# Patient Record
Sex: FEMALE | Race: BLACK OR AFRICAN AMERICAN | NOT HISPANIC OR LATINO | Employment: FULL TIME | URBAN - METROPOLITAN AREA
[De-identification: names, ages, dates, MRNs, and addresses within clinical notes are randomized per-mention and may not be internally consistent; named-entity substitution may affect disease eponyms.]

---

## 2017-11-13 ENCOUNTER — HOSPITAL ENCOUNTER (EMERGENCY)
Facility: HOSPITAL | Age: 42
Discharge: HOME | End: 2017-11-14
Attending: EMERGENCY MEDICINE
Payer: MEDICAID

## 2017-11-13 VITALS
HEART RATE: 87 BPM | WEIGHT: 146 LBS | SYSTOLIC BLOOD PRESSURE: 120 MMHG | RESPIRATION RATE: 16 BRPM | HEIGHT: 61 IN | OXYGEN SATURATION: 96 % | DIASTOLIC BLOOD PRESSURE: 98 MMHG | BODY MASS INDEX: 27.56 KG/M2 | TEMPERATURE: 98 F
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
## 2017-11-13 DIAGNOSIS — M54.6 ACUTE BILATERAL THORACIC BACK PAIN: Primary | ICD-10-CM

## 2017-11-13 LAB
B-HCG UR QL: NEGATIVE
CTP QC/QA: YES

## 2017-11-13 PROCEDURE — 81025 URINE PREGNANCY TEST: CPT | Performed by: NURSE PRACTITIONER

## 2017-11-13 PROCEDURE — 25000003 PHARM REV CODE 250: Performed by: NURSE PRACTITIONER

## 2017-11-13 RX ORDER — IBUPROFEN 600 MG/1
600 TABLET ORAL
Status: COMPLETED | OUTPATIENT
Start: 2017-11-13 | End: 2017-11-13

## 2017-11-13 RX ADMIN — IBUPROFEN 600 MG: 600 TABLET, FILM COATED ORAL at 11:11

## 2017-11-14 RX ORDER — METHOCARBAMOL 500 MG/1
1000 TABLET, FILM COATED ORAL 3 TIMES DAILY
Qty: 30 TABLET | Refills: 0 | Status: SHIPPED | OUTPATIENT
Start: 2017-11-14 | End: 2017-11-19

## 2017-11-14 RX ORDER — IBUPROFEN 600 MG/1
600 TABLET ORAL EVERY 6 HOURS PRN
Qty: 20 TABLET | Refills: 0 | Status: SHIPPED | OUTPATIENT
Start: 2017-11-14 | End: 2018-04-19 | Stop reason: SDUPTHER

## 2017-11-14 NOTE — ED PROVIDER NOTES
"Encounter Date: 11/13/2017    SCRIBE #1 NOTE: I, Rhea Hall, am scribing for, and in the presence of,  Hilary Duvall NP . I have scribed the following portions of the note - Other sections scribed: HPI and ROS .       History     Chief Complaint   Patient presents with    Neck Pain     " I sat in a chair last week and it broke. I fell backwards hitting a wall. My neck and upper back started hurting."      CC: Neck and Back Pain.   History obtained from patient.  Pt arrived via personal transportation.     HPI: This 42 y.o. Female, who has Anemia, HTN, encounter for blood transfusion, and a history of seizure disorder, presents to the ED for evaluation of posterior neck pain, upper back pain, and occipital headache x 1 week secondary to falling backwards in a broken folding chair and hitting back, neck, and occipital head on the wall behind her. Symptoms have progressively worsened. She describes pain as constant, moderately severe (7/10), and "tight." She denies any LOC at that time. She denies blurred vision, nausea, vomiting, numbness, tingling, fever, chills, chest pain, or abdominal pain. Over the past week, she has used Motrin and Advil without relief of pain. She took Motrin this morning with no relief. Pain is exacerbated by palpation and movement. She reports no further symptoms. No alleviating factors.           Review of patient's allergies indicates:  No Known Allergies  Past Medical History:   Diagnosis Date    Anemia     Encounter for blood transfusion     History of seizure disorder     HTN (hypertension)     Seizures     last one when she was 20 years old     Past Surgical History:   Procedure Laterality Date    HERNIA REPAIR      TUBAL LIGATION       Family History   Problem Relation Age of Onset    Asthma Mother     Asthma Sister      Social History   Substance Use Topics    Smoking status: Never Smoker    Smokeless tobacco: Never Used    Alcohol use Yes      Comment: occasional "     Review of Systems   Constitutional: Negative for chills and fever.   HENT: Negative for congestion.    Eyes: Negative for redness.   Respiratory: Negative for cough and shortness of breath.    Cardiovascular: Negative for chest pain.   Gastrointestinal: Negative for abdominal pain, diarrhea, nausea and vomiting.   Genitourinary: Negative for difficulty urinating and dysuria.   Musculoskeletal: Positive for back pain and neck pain.   Skin: Negative for rash and wound.   Neurological: Positive for headaches. Negative for syncope.       Physical Exam     Initial Vitals [11/13/17 2015]   BP Pulse Resp Temp SpO2   (!) 120/98 87 16 98.2 °F (36.8 °C) 96 %      MAP       105.33         Physical Exam    Constitutional: Vital signs are normal. She appears well-developed and well-nourished.  Non-toxic appearance.   HENT:   Head: Normocephalic and atraumatic.   Eyes: Conjunctivae and EOM are normal. Pupils are equal, round, and reactive to light.   Neck: Full passive range of motion without pain. Neck supple. No spinous process tenderness and no muscular tenderness present. Normal range of motion present. No neck rigidity.   Cardiovascular: Normal rate, S1 normal, S2 normal and normal heart sounds. Exam reveals no gallop.    No murmur heard.  Pulmonary/Chest: Effort normal and breath sounds normal. No tachypnea. She has no decreased breath sounds. She has no wheezes. She has no rhonchi. She has no rales.   Abdominal: Soft. Normal appearance. There is no tenderness. There is no CVA tenderness, no tenderness at McBurney's point and negative Robles's sign.   Musculoskeletal:        Cervical back: Normal. She exhibits normal range of motion, no tenderness and no bony tenderness.        Thoracic back: She exhibits tenderness. She exhibits normal range of motion and no bony tenderness.        Lumbar back: Normal. She exhibits no tenderness and no bony tenderness.        Back:    Neurological: She is alert and oriented to person,  place, and time. She has normal strength. Gait normal. GCS eye subscore is 4. GCS verbal subscore is 5. GCS motor subscore is 6.   Skin: Skin is warm and dry. No rash noted.         ED Course   Procedures  Labs Reviewed   POCT URINE PREGNANCY          X-Rays:   Independently Interpreted Readings:   Other Readings:  Thoracolumbar spine xray: No acute abnormalities    Medical Decision Making:   ED Management:  This is a 42-year-old female who presents to the ED with complaints of neck and thoracic back pain after falling from a chair one week ago.  She is afebrile and well-appearing.  She states her headache is resolved but her back still hurts.  On exam, there is no midline bony tenderness.  Breath sounds are clear.  X-ray negative.  Ibuprofen given.  No evidence to suggest fracture, dislocation, epidural abscess, cauda equina or other serious injury.  Discharged home with prescriptions for ibuprofen 600 and Robaxin.  Instructions given for supportive care and follow-up.  Return precautions given.  Case discussed with Dr. Hamilton, who agrees with plan.            Scribe Attestation:   Scribe #1: I performed the above scribed service and the documentation accurately describes the services I performed. I attest to the accuracy of the note.    Attending Attestation:           Physician Attestation for Scribe:  Physician Attestation Statement for Scribe #1: I, Hilary Duvall NP , reviewed documentation, as scribed by Rhea Hall  in my presence, and it is both accurate and complete.                 ED Course      Clinical Impression:   The primary encounter diagnosis was Acute bilateral thoracic back pain. A diagnosis of Fall, initial encounter was also pertinent to this visit.    Disposition:   Disposition: Discharged  Condition: Stable                        Hilary Duvall NP  11/14/17 0429

## 2017-11-14 NOTE — DISCHARGE INSTRUCTIONS
Please return to the ED for any new or worsening symptoms: chest pain, shortness of breath, loss of consciousness or any other concerns. Please follow up with primary care within in the week. You may also call [phone] for the [facility] same day appointment line.

## 2017-11-14 NOTE — ED TRIAGE NOTES
Pt c/o pain in shoulders, upper back , and back of head/neck due to fall last week. Pt states she was sitting on a chair when the chair broke and she hit her head on the wall. Pt took motrin OTC without relief.

## 2018-04-18 ENCOUNTER — HOSPITAL ENCOUNTER (EMERGENCY)
Facility: HOSPITAL | Age: 43
Discharge: HOME | End: 2018-04-19
Attending: EMERGENCY MEDICINE
Payer: MEDICAID

## 2018-04-18 DIAGNOSIS — R91.1 LESION OF LUNG: ICD-10-CM

## 2018-04-18 DIAGNOSIS — R52 PAIN: ICD-10-CM

## 2018-04-18 DIAGNOSIS — Z87.828 HISTORY OF TRAUMA: ICD-10-CM

## 2018-04-18 DIAGNOSIS — R10.9 LEFT FLANK PAIN: Primary | ICD-10-CM

## 2018-04-18 DIAGNOSIS — R59.9 ADENOPATHY: ICD-10-CM

## 2018-04-18 LAB
B-HCG UR QL: NEGATIVE
BACTERIA #/AREA URNS HPF: ABNORMAL /HPF
BILIRUB UR QL STRIP: NEGATIVE
CAOX CRY URNS QL MICRO: ABNORMAL
CLARITY UR: ABNORMAL
COLOR UR: YELLOW
CTP QC/QA: YES
GLUCOSE UR QL STRIP: NEGATIVE
HGB UR QL STRIP: ABNORMAL
KETONES UR QL STRIP: NEGATIVE
LEUKOCYTE ESTERASE UR QL STRIP: NEGATIVE
MICROSCOPIC COMMENT: ABNORMAL
NITRITE UR QL STRIP: NEGATIVE
PH UR STRIP: 5 [PH] (ref 5–8)
PROT UR QL STRIP: NEGATIVE
RBC #/AREA URNS HPF: 2 /HPF (ref 0–4)
SP GR UR STRIP: 1.02 (ref 1–1.03)
SQUAMOUS #/AREA URNS HPF: 3 /HPF
URATE CRY URNS QL MICRO: ABNORMAL
URN SPEC COLLECT METH UR: ABNORMAL
UROBILINOGEN UR STRIP-ACNC: ABNORMAL EU/DL
WBC #/AREA URNS HPF: 1 /HPF (ref 0–5)

## 2018-04-18 PROCEDURE — 81025 URINE PREGNANCY TEST: CPT | Performed by: EMERGENCY MEDICINE

## 2018-04-18 PROCEDURE — 99284 EMERGENCY DEPT VISIT MOD MDM: CPT | Mod: 25

## 2018-04-18 PROCEDURE — 81000 URINALYSIS NONAUTO W/SCOPE: CPT

## 2018-04-18 PROCEDURE — 63600175 PHARM REV CODE 636 W HCPCS: Performed by: PHYSICIAN ASSISTANT

## 2018-04-18 PROCEDURE — 96372 THER/PROPH/DIAG INJ SC/IM: CPT

## 2018-04-18 RX ORDER — KETOROLAC TROMETHAMINE 30 MG/ML
30 INJECTION, SOLUTION INTRAMUSCULAR; INTRAVENOUS
Status: COMPLETED | OUTPATIENT
Start: 2018-04-18 | End: 2018-04-18

## 2018-04-18 RX ORDER — ORPHENADRINE CITRATE 30 MG/ML
30 INJECTION INTRAMUSCULAR; INTRAVENOUS
Status: COMPLETED | OUTPATIENT
Start: 2018-04-18 | End: 2018-04-18

## 2018-04-18 RX ORDER — NAPROXEN 500 MG/1
500 TABLET ORAL 2 TIMES DAILY
Status: ON HOLD | COMMUNITY
End: 2022-02-16 | Stop reason: HOSPADM

## 2018-04-18 RX ADMIN — ORPHENADRINE CITRATE 30 MG: 30 INJECTION INTRAMUSCULAR; INTRAVENOUS at 11:04

## 2018-04-18 RX ADMIN — KETOROLAC TROMETHAMINE 30 MG: 30 INJECTION, SOLUTION INTRAMUSCULAR at 11:04

## 2018-04-19 ENCOUNTER — TELEPHONE (OUTPATIENT)
Dept: FAMILY MEDICINE | Facility: CLINIC | Age: 43
End: 2018-04-19

## 2018-04-19 VITALS
HEIGHT: 61 IN | WEIGHT: 145 LBS | SYSTOLIC BLOOD PRESSURE: 146 MMHG | BODY MASS INDEX: 27.38 KG/M2 | HEART RATE: 70 BPM | DIASTOLIC BLOOD PRESSURE: 99 MMHG | OXYGEN SATURATION: 99 % | RESPIRATION RATE: 20 BRPM | TEMPERATURE: 98 F

## 2018-04-19 LAB
ALBUMIN SERPL BCP-MCNC: 3.5 G/DL
ALP SERPL-CCNC: 84 U/L
ALT SERPL W/O P-5'-P-CCNC: 17 U/L
ANION GAP SERPL CALC-SCNC: 8 MMOL/L
AST SERPL-CCNC: 18 U/L
BASOPHILS # BLD AUTO: 0.03 K/UL
BASOPHILS NFR BLD: 0.4 %
BILIRUB SERPL-MCNC: 0.5 MG/DL
BUN SERPL-MCNC: 13 MG/DL
CALCIUM SERPL-MCNC: 8.9 MG/DL
CHLORIDE SERPL-SCNC: 109 MMOL/L
CO2 SERPL-SCNC: 22 MMOL/L
CREAT SERPL-MCNC: 0.9 MG/DL
DIFFERENTIAL METHOD: ABNORMAL
EOSINOPHIL # BLD AUTO: 0.2 K/UL
EOSINOPHIL NFR BLD: 2.2 %
ERYTHROCYTE [DISTWIDTH] IN BLOOD BY AUTOMATED COUNT: 14.6 %
EST. GFR  (AFRICAN AMERICAN): >60 ML/MIN/1.73 M^2
EST. GFR  (NON AFRICAN AMERICAN): >60 ML/MIN/1.73 M^2
GLUCOSE SERPL-MCNC: 98 MG/DL
HCT VFR BLD AUTO: 32.3 %
HGB BLD-MCNC: 10.4 G/DL
LYMPHOCYTES # BLD AUTO: 2.4 K/UL
LYMPHOCYTES NFR BLD: 27.9 %
MCH RBC QN AUTO: 27.6 PG
MCHC RBC AUTO-ENTMCNC: 32.2 G/DL
MCV RBC AUTO: 86 FL
MONOCYTES # BLD AUTO: 0.7 K/UL
MONOCYTES NFR BLD: 8.1 %
NEUTROPHILS # BLD AUTO: 5.2 K/UL
NEUTROPHILS NFR BLD: 61 %
PLATELET # BLD AUTO: 278 K/UL
PMV BLD AUTO: 9.9 FL
POTASSIUM SERPL-SCNC: 3.6 MMOL/L
PROT SERPL-MCNC: 8 G/DL
RBC # BLD AUTO: 3.77 M/UL
SODIUM SERPL-SCNC: 139 MMOL/L
WBC # BLD AUTO: 8.47 K/UL

## 2018-04-19 PROCEDURE — 87086 URINE CULTURE/COLONY COUNT: CPT

## 2018-04-19 PROCEDURE — 80053 COMPREHEN METABOLIC PANEL: CPT

## 2018-04-19 PROCEDURE — 85025 COMPLETE CBC W/AUTO DIFF WBC: CPT

## 2018-04-19 PROCEDURE — 25500020 PHARM REV CODE 255: Performed by: EMERGENCY MEDICINE

## 2018-04-19 RX ORDER — AMLODIPINE AND OLMESARTAN MEDOXOMIL 5; 20 MG/1; MG/1
1 TABLET ORAL DAILY
Qty: 30 TABLET | Refills: 0 | Status: ON HOLD | OUTPATIENT
Start: 2018-04-19 | End: 2019-02-13 | Stop reason: SDUPTHER

## 2018-04-19 RX ORDER — LIDOCAINE 50 MG/G
1 PATCH TOPICAL DAILY
Qty: 6 PATCH | Refills: 0 | Status: ON HOLD | OUTPATIENT
Start: 2018-04-19 | End: 2022-02-16 | Stop reason: HOSPADM

## 2018-04-19 RX ORDER — ORPHENADRINE CITRATE 100 MG/1
100 TABLET, EXTENDED RELEASE ORAL 2 TIMES DAILY
Qty: 8 TABLET | Refills: 0 | Status: SHIPPED | OUTPATIENT
Start: 2018-04-19 | End: 2018-04-23

## 2018-04-19 RX ORDER — LEVOFLOXACIN 500 MG/1
500 TABLET, FILM COATED ORAL DAILY
Qty: 7 TABLET | Refills: 0 | Status: SHIPPED | OUTPATIENT
Start: 2018-04-19 | End: 2018-04-26

## 2018-04-19 RX ORDER — MELOXICAM 7.5 MG/1
7.5 TABLET ORAL DAILY
Qty: 12 TABLET | Refills: 0 | Status: ON HOLD | OUTPATIENT
Start: 2018-04-19 | End: 2022-02-16 | Stop reason: HOSPADM

## 2018-04-19 RX ADMIN — IOHEXOL 70 ML: 350 INJECTION, SOLUTION INTRAVENOUS at 01:04
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
## 2018-04-19 NOTE — TELEPHONE ENCOUNTER
Patient needs to be seen regarding a lesion on her lung, however patient has not been seen in a clinic within the [facility] system since 2015. She was encouraged to start establishing care at the [facility]
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
## 2018-04-21 LAB — BACTERIA UR CULT: NORMAL

## 2018-06-05 ENCOUNTER — HOSPITAL ENCOUNTER (EMERGENCY)
Facility: HOSPITAL | Age: 43
Discharge: HOME | End: 2018-06-05
Attending: EMERGENCY MEDICINE
Payer: MEDICAID

## 2018-06-05 VITALS
BODY MASS INDEX: 28.32 KG/M2 | TEMPERATURE: 98 F | SYSTOLIC BLOOD PRESSURE: 154 MMHG | OXYGEN SATURATION: 98 % | WEIGHT: 150 LBS | HEIGHT: 61 IN | DIASTOLIC BLOOD PRESSURE: 99 MMHG | RESPIRATION RATE: 18 BRPM | HEART RATE: 62 BPM

## 2018-06-05 DIAGNOSIS — M54.2 NECK PAIN: ICD-10-CM
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
## 2018-06-05 LAB
B-HCG UR QL: NEGATIVE
CTP QC/QA: YES

## 2018-06-05 PROCEDURE — 25000003 PHARM REV CODE 250: Performed by: PHYSICIAN ASSISTANT

## 2018-06-05 PROCEDURE — 81025 URINE PREGNANCY TEST: CPT | Performed by: NURSE PRACTITIONER

## 2018-06-05 PROCEDURE — 99284 EMERGENCY DEPT VISIT MOD MDM: CPT | Mod: 25

## 2018-06-05 RX ORDER — KETOROLAC TROMETHAMINE 10 MG/1
10 TABLET, FILM COATED ORAL
Status: DISCONTINUED | OUTPATIENT
Start: 2018-06-05 | End: 2018-06-05

## 2018-06-05 RX ORDER — CYCLOBENZAPRINE HCL 10 MG
10 TABLET ORAL
Status: COMPLETED | OUTPATIENT
Start: 2018-06-05 | End: 2018-06-05

## 2018-06-05 RX ORDER — KETOROLAC TROMETHAMINE 30 MG/ML
10 INJECTION, SOLUTION INTRAMUSCULAR; INTRAVENOUS
Status: DISCONTINUED | OUTPATIENT
Start: 2018-06-05 | End: 2018-06-05 | Stop reason: HOSPADM

## 2018-06-05 RX ORDER — SULINDAC 150 MG/1
150 TABLET ORAL 2 TIMES DAILY
Qty: 10 TABLET | Refills: 0 | Status: SHIPPED | OUTPATIENT
Start: 2018-06-05 | End: 2018-06-10

## 2018-06-05 RX ORDER — ORPHENADRINE CITRATE 30 MG/ML
30 INJECTION INTRAMUSCULAR; INTRAVENOUS
Status: DISCONTINUED | OUTPATIENT
Start: 2018-06-05 | End: 2018-06-05

## 2018-06-05 RX ORDER — CYCLOBENZAPRINE HCL 10 MG
10 TABLET ORAL
Status: DISCONTINUED | OUTPATIENT
Start: 2018-06-05 | End: 2018-06-05 | Stop reason: HOSPADM

## 2018-06-05 RX ORDER — KETOROLAC TROMETHAMINE 10 MG/1
10 TABLET, FILM COATED ORAL
Status: COMPLETED | OUTPATIENT
Start: 2018-06-05 | End: 2018-06-05

## 2018-06-05 RX ORDER — CYCLOBENZAPRINE HCL 10 MG
10 TABLET ORAL 3 TIMES DAILY PRN
Qty: 15 TABLET | Refills: 0 | Status: SHIPPED | OUTPATIENT
Start: 2018-06-05 | End: 2018-06-10

## 2018-06-05 RX ADMIN — CYCLOBENZAPRINE HYDROCHLORIDE 10 MG: 10 TABLET, FILM COATED ORAL at 01:06

## 2018-06-05 RX ADMIN — KETOROLAC TROMETHAMINE 10 MG: 10 TABLET, FILM COATED ORAL at 01:06

## 2018-06-05 NOTE — DISCHARGE INSTRUCTIONS
Please avoid heavy lifting and strenuous exercise for the next several days.    You can apply warm heat to the area of your neck pain using a heating pad for relief of muscle tension.    You have been prescribed Clinoril for pain. Do not take additional NSAIDs (ibuprofen, naproxen, lodine, etc) while taking this medication.    You have been prescribed Flexeril for muscle pain.  This medication causes drowsiness.  Do not drink alcohol or operate motor vehicles while taking.    Please follow-up with your primary care provider if your symptoms continue.    Return to the emergency department for any new or worsening symptoms.
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
## 2018-06-05 NOTE — ED NOTES
Pt refused IM meds ordered. Pt states that she still feels lump where she was given last injection.
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
## 2018-07-17 ENCOUNTER — HOSPITAL ENCOUNTER (EMERGENCY)
Facility: HOSPITAL | Age: 43
Discharge: HOME | End: 2018-07-17
Attending: EMERGENCY MEDICINE
Payer: MEDICAID

## 2018-07-17 VITALS
HEIGHT: 61 IN | DIASTOLIC BLOOD PRESSURE: 70 MMHG | WEIGHT: 150 LBS | RESPIRATION RATE: 16 BRPM | TEMPERATURE: 99 F | OXYGEN SATURATION: 99 % | BODY MASS INDEX: 28.32 KG/M2 | HEART RATE: 88 BPM | SYSTOLIC BLOOD PRESSURE: 164 MMHG

## 2018-07-17 DIAGNOSIS — N75.0 BARTHOLIN GLAND CYST: Primary | ICD-10-CM

## 2018-07-17 LAB
B-HCG UR QL: NEGATIVE
CTP QC/QA: YES

## 2018-07-17 PROCEDURE — 99283 EMERGENCY DEPT VISIT LOW MDM: CPT | Mod: 25

## 2018-07-17 PROCEDURE — 81025 URINE PREGNANCY TEST: CPT | Performed by: PHYSICIAN ASSISTANT

## 2018-07-17 PROCEDURE — 25000003 PHARM REV CODE 250: Performed by: PHYSICIAN ASSISTANT

## 2018-07-17 RX ORDER — NAPROXEN 500 MG/1
500 TABLET ORAL
Status: COMPLETED | OUTPATIENT
Start: 2018-07-17 | End: 2018-07-17

## 2018-07-17 RX ORDER — MELOXICAM 7.5 MG/1
7.5 TABLET ORAL DAILY
Qty: 12 TABLET | Refills: 0 | Status: ON HOLD | OUTPATIENT
Start: 2018-07-17 | End: 2022-02-16 | Stop reason: HOSPADM

## 2018-07-17 RX ORDER — SULFAMETHOXAZOLE AND TRIMETHOPRIM 800; 160 MG/1; MG/1
1 TABLET ORAL
Status: COMPLETED | OUTPATIENT
Start: 2018-07-17 | End: 2018-07-17

## 2018-07-17 RX ORDER — SULFAMETHOXAZOLE AND TRIMETHOPRIM 800; 160 MG/1; MG/1
1 TABLET ORAL 2 TIMES DAILY
Qty: 20 TABLET | Refills: 0 | Status: SHIPPED | OUTPATIENT
Start: 2018-07-17 | End: 2018-07-27

## 2018-07-17 RX ADMIN — SULFAMETHOXAZOLE AND TRIMETHOPRIM 1 TABLET: 800; 160 TABLET ORAL at 05:07

## 2018-07-17 RX ADMIN — NAPROXEN 500 MG: 500 TABLET ORAL at 05:07

## 2018-07-17 NOTE — ED TRIAGE NOTES
Patient  Reports an abscess that to her vaginal area x two days. Patient reports a hx of vaginal abscess.

## 2018-07-17 NOTE — ED PROVIDER NOTES
"Encounter Date: 7/17/2018  SORT: This is a 43 y.o. female with Bartholin's cyst who presents for emergent consideration of abscess of left vaginal region.  Initial exam : AAO x3 in NAD. Patient will be moved to a room when one is available. Orders placed.  THAI Garibay PA-C 07/17/2018 1641    SCRIBE #1 NOTE: I, Criss Harley, am scribing for, and in the presence of,  Surinder Coley PA-C. I have scribed the following portions of the note - Other sections scribed: HPI, ROS.       History     Chief Complaint   Patient presents with    Wound Infection     States she has an abscess in her vaginal area that she noticed 2 days ago     CC: Wound Infection     HPI: 42 y/o female with PMHx of Anemia; Encounter for blood transfusion; History of seizure disorder; HTN (hypertension); and Seizures presents to the ED c/o acute onset vaginal abscess to the L labia majora that she noticed x2 days ago. Pt reports she is used to this happening due to cyst that needs to be excised. Pt states she never had time to get it excised in the past but plans to do so soon. Cyst is not draining. Pt had s/s x3-4 times in the past that usually get "really big," last was around February or March that popped on its own. Pt is UTD on tetanus shot. Pt denies dysuria, vaginal bleeding, or vaginal d/c. No other symptoms reported.      The history is provided by the patient. No  was used.     Review of patient's allergies indicates:  No Known Allergies  Past Medical History:   Diagnosis Date    Anemia     Encounter for blood transfusion     History of seizure disorder     HTN (hypertension)     Seizures     last one when she was 20 years old     Past Surgical History:   Procedure Laterality Date    HERNIA REPAIR      TUBAL LIGATION       Family History   Problem Relation Age of Onset    Asthma Mother     Asthma Sister      Social History   Substance Use Topics    Smoking status: Never Smoker    Smokeless tobacco: " Never Used    Alcohol use Yes      Comment: occasional     Review of Systems   Constitutional: Negative for chills and fever.   HENT: Negative for congestion, ear pain, rhinorrhea and sore throat.    Eyes: Negative for pain and visual disturbance.   Respiratory: Negative for cough and shortness of breath.    Cardiovascular: Negative for chest pain.   Gastrointestinal: Negative for abdominal pain, diarrhea, nausea and vomiting.   Genitourinary: Negative for dysuria, vaginal bleeding and vaginal discharge.   Musculoskeletal: Negative for back pain and neck pain.   Skin: Negative for rash.        (+) abscess to L labia majora    Neurological: Negative for headaches.       Physical Exam     Initial Vitals [07/17/18 1641]   BP Pulse Resp Temp SpO2   (!) 171/100 94 16 99.2 °F (37.3 °C) 100 %      MAP       --         Physical Exam    Nursing note and vitals reviewed.  Constitutional: She appears well-developed and well-nourished. She is not diaphoretic. No distress.   HENT:   Head: Normocephalic and atraumatic.   Nose: Nose normal.   Eyes: Conjunctivae and EOM are normal. Right eye exhibits no discharge. Left eye exhibits no discharge.   Neck: Normal range of motion. No tracheal deviation present. No JVD present.   Cardiovascular: Normal rate, regular rhythm and normal heart sounds. Exam reveals no friction rub.    No murmur heard.  Pulmonary/Chest: Breath sounds normal. No stridor. No respiratory distress. She has no wheezes. She has no rhonchi. She has no rales. She exhibits no tenderness.   Abdominal: Soft. She exhibits no distension. There is no tenderness. There is no rigidity, no rebound, no guarding, no CVA tenderness, no tenderness at McBurney's point and negative Robles's sign.   Genitourinary:       Pelvic exam was performed with patient supine. There is no rash, tenderness or lesion on the right labia.   Genitourinary Comments: 2cm small area of swelling to L lower labia. Mild focal TTP. No drainage. Minimal  erythema. No fluctuance or induration. No herpetic lesions.    Musculoskeletal: Normal range of motion.   Neurological: She is alert and oriented to person, place, and time.   Skin: Skin is warm and dry. No rash and no abscess noted. No erythema. No pallor.         ED Course   Procedures  Labs Reviewed   POCT URINE PREGNANCY          Imaging Results    None          Medical Decision Making:   History:   Old Medical Records: I decided to obtain old medical records.  Initial Assessment:   43-year-old female with history of bartholins cysts presents to the ED for vaginal pain similar to past cysts.   Clinical Tests:   Lab Tests: Ordered and Reviewed  ED Management:  Presentation consistent with Bartholin's gland cyst. Given very mild and early presentation, I do not feel patient would benefit from emergent I&D at this time, but may in the future. Very mild overlying cellulitis. No herpetic lesions. Does not endorse symptoms concerning for PID.     Advising OBGYN follow up. Strict return precautions discussed. Patient agreeable to plan.   Other:   I have discussed this case with another health care provider.  Discussed with attending            Scribe Attestation:   Scribe #1: I performed the above scribed service and the documentation accurately describes the services I performed. I attest to the accuracy of the note.    Attending Attestation:           Physician Attestation for Scribe:  Physician Attestation Statement for Scribe #1: I, Surinder Coley PA-C, reviewed documentation, as scribed by Criss Harley in my presence, and it is both accurate and complete.                    Clinical Impression:   The encounter diagnosis was Bartholin gland cyst.      Disposition:   Disposition: Discharged  Condition: Stable                        Surinder Coley PA-C  07/17/18 2022

## 2018-09-12 ENCOUNTER — HOSPITAL ENCOUNTER (EMERGENCY)
Facility: HOSPITAL | Age: 43
Discharge: HOME | End: 2018-09-12
Attending: EMERGENCY MEDICINE
Payer: MEDICAID

## 2018-09-12 VITALS
BODY MASS INDEX: 28.32 KG/M2 | TEMPERATURE: 98 F | OXYGEN SATURATION: 98 % | RESPIRATION RATE: 20 BRPM | WEIGHT: 150 LBS | SYSTOLIC BLOOD PRESSURE: 128 MMHG | HEART RATE: 60 BPM | HEIGHT: 61 IN | DIASTOLIC BLOOD PRESSURE: 84 MMHG

## 2018-09-12 DIAGNOSIS — R51.9 NONINTRACTABLE HEADACHE, UNSPECIFIED CHRONICITY PATTERN, UNSPECIFIED HEADACHE TYPE: Primary | ICD-10-CM

## 2018-09-12 LAB
B-HCG UR QL: NEGATIVE
CTP QC/QA: YES

## 2018-09-12 PROCEDURE — 25000003 PHARM REV CODE 250: Performed by: NURSE PRACTITIONER

## 2018-09-12 PROCEDURE — 81025 URINE PREGNANCY TEST: CPT | Performed by: NURSE PRACTITIONER

## 2018-09-12 PROCEDURE — 99284 EMERGENCY DEPT VISIT MOD MDM: CPT | Mod: 25

## 2018-09-12 RX ORDER — IBUPROFEN 600 MG/1
600 TABLET ORAL
Status: COMPLETED | OUTPATIENT
Start: 2018-09-12 | End: 2018-09-12

## 2018-09-12 RX ORDER — IBUPROFEN 800 MG/1
800 TABLET ORAL EVERY 6 HOURS PRN
Qty: 20 TABLET | Refills: 0 | Status: SHIPPED | OUTPATIENT
Start: 2018-09-12 | End: 2021-05-10

## 2018-09-12 RX ORDER — DEXTROMETHORPHAN HYDROBROMIDE, GUAIFENESIN 5; 100 MG/5ML; MG/5ML
650 LIQUID ORAL EVERY 8 HOURS PRN
Qty: 30 TABLET | Refills: 0 | Status: SHIPPED | OUTPATIENT
Start: 2018-09-12 | End: 2018-09-19 | Stop reason: ALTCHOICE

## 2018-09-12 RX ORDER — ACETAMINOPHEN 325 MG/1
650 TABLET ORAL
Status: COMPLETED | OUTPATIENT
Start: 2018-09-12 | End: 2018-09-12

## 2018-09-12 RX ADMIN — ACETAMINOPHEN 650 MG: 325 TABLET, FILM COATED ORAL at 09:09

## 2018-09-12 RX ADMIN — IBUPROFEN 600 MG: 600 TABLET ORAL at 09:09

## 2018-09-13 NOTE — ED TRIAGE NOTES
Patient arrived to ED with c/o occipital headache since Monday.  Denies blurred vision, dizziness, weakness, or n/v.  No acute distress noted.

## 2018-09-13 NOTE — ED PROVIDER NOTES
Encounter Date: 9/12/2018    SCRIBE #1 NOTE: I, Michelle Murray, am scribing for, and in the presence of,  Nona Sosa NP. I have scribed the following portions of the note - Other sections scribed: [place].       History     Chief Complaint   Patient presents with    Headache     Constant Occipital headache since Monday, but denies taking any meds to help her pain. Denies dizziness, nv     CC:     43 year old female  has a past medical history of Anemia, Encounter for blood transfusion, History of seizure disorder, HTN (hypertension), and Seizures presents to the ED for evaluation of a 2 day history of an occipital headache. She reports the headache is intermittent w/ a pounding sensation. She reports headache is exacerbated by loud noises. Pt reports a history of headaches but reports the location and length of this headache are different. Pt has not attempted treatment. Pt reports working 80 hours a week. She reports decreased sleep and increased stress. Pt denies visual disturbance, nausea, vomiting, and other associated symptoms.                 Review of patient's allergies indicates:  No Known Allergies  Past Medical History:   Diagnosis Date    Anemia     Encounter for blood transfusion     History of seizure disorder     HTN (hypertension)     Seizures     last one when she was 20 years old     Past Surgical History:   Procedure Laterality Date    HERNIA REPAIR      TUBAL LIGATION       Family History   Problem Relation Age of Onset    Asthma Mother     Asthma Sister      Social History     Tobacco Use    Smoking status: Never Smoker    Smokeless tobacco: Never Used   Substance Use Topics    Alcohol use: Yes     Comment: occasional    Drug use: No     Review of Systems   Constitutional: Negative for fever.   HENT: Negative for sore throat.    Respiratory: Negative for shortness of breath.    Cardiovascular: Negative for chest pain.   Gastrointestinal: Negative for nausea.    Genitourinary: Negative for dysuria.   Musculoskeletal: Negative for back pain.   Skin: Negative for rash.   Neurological: Positive for headaches (occipital ). Negative for weakness.   Hematological: Does not bruise/bleed easily.       Physical Exam     Initial Vitals [09/12/18 1854]   BP Pulse Resp Temp SpO2   (!) 145/92 78 17 98.8 °F (37.1 °C) 96 %      MAP       --         Physical Exam    Constitutional: She appears well-developed and well-nourished. She is not diaphoretic. No distress.   HENT:   Head: Normocephalic and atraumatic.   Right Ear: Hearing, tympanic membrane, external ear and ear canal normal.   Left Ear: Hearing, tympanic membrane, external ear and ear canal normal.   Nose: Nose normal. Right sinus exhibits no maxillary sinus tenderness and no frontal sinus tenderness. Left sinus exhibits no maxillary sinus tenderness and no frontal sinus tenderness.   Mouth/Throat: Uvula is midline and oropharynx is clear and moist. No oropharyngeal exudate.   Eyes: EOM are normal. Pupils are equal, round, and reactive to light. Right eye exhibits no discharge. Left eye exhibits no discharge.   Neck: Trachea normal, normal range of motion, full passive range of motion without pain and phonation normal. Neck supple.   Cardiovascular: Normal rate, regular rhythm and normal heart sounds. Exam reveals no gallop and no friction rub.    No murmur heard.  Pulmonary/Chest: Breath sounds normal. No respiratory distress.   Abdominal: Soft. There is no tenderness.   Musculoskeletal: Normal range of motion.   Neurological: She is alert and oriented to person, place, and time. She has normal strength. No cranial nerve deficit or sensory deficit. She displays a negative Romberg sign. GCS eye subscore is 4. GCS verbal subscore is 5. GCS motor subscore is 6.   Skin: Skin is warm and dry.   Psychiatric: She has a normal mood and affect. Her behavior is normal.         ED Course   Procedures  Labs Reviewed   POCT URINE PREGNANCY           Imaging Results          CT Head Without Contrast (Final result)  Result time 09/12/18 20:55:08    Final result by David Pierce MD (09/12/18 20:55:08)                 Impression:      No acute intracranial abnormalities identified.      Electronically signed by: David Pierce MD  Date:    09/12/2018  Time:    20:55             Narrative:    EXAMINATION:  CT HEAD WITHOUT CONTRAST    CLINICAL HISTORY:  occipital headache;    TECHNIQUE:  Low dose axial images were obtained through the head.  Coronal and sagittal reformations were also performed. Contrast was not administered.    COMPARISON:  None.    FINDINGS:  The brain is normally formed and exhibits normal density throughout with no indication of acute/recent major vascular distribution cerebral infarction, intraparenchymal hemorrhage, or intra-axial space occupying lesion. The ventricular system is normal in size and configuration with no evidence of hydrocephalus. No effacement of the skull-base cisterns. No abnormal extra-axial fluid collections or blood products. The visualized paranasal sinuses and mastoid air cells are clear. The calvarium shows no significant abnormality.                                 Medical Decision Making:   ED Management:  This is an evaluation of a 43 y.o. female that presents to the Emergency Department for headache. The patient is a non-toxic, afebrile, and well appearing female. On physical exam, she has no focal weakness or neurological deficits. Has full ROM of neck with no nuchal rigidity or meningeal signs. There is no staggering or ataxic gait, vomiting, double vision, visual loss, slurred speech, numbness of the face or body, weakness, clumsiness, or incoordination.    Vital Signs are Reassuring.  RESULTS:   UPT negative.  CT of the head with no acute intracranial abnormality identified.    Offered patient IV fluids and medications, however patient declined stating she needed to drive home and did not want to  wait for medications to infuse.  CT of the head with no acute abnormality.  Neuro exam normal. She is well-appearing.  Patient reports increased stress with work and home life as well as decrease in sleep.  She is working 80 our work weeks.  Likely contributing factors.  Based on my clinical evaluation, I do not appreciate any immediate, emergent, or life threatening condition or etiology that warrants additional workup today.  I feel the patient can be discharged with close follow-up care.    Given the above findings, my overall impression is headache. Given the above findings, I do not think the patient has meningitis, SAH\ICH, intracranial mass, migraine headache, tension headache, neoplasm.    ED Treatments:  Motrin, Tylenol. DC Meds:  Motrin, Tylenol. Additional recommendations headache self care. The diagnosis, treatment plan, instructions for follow-up and reevaluation with PCP as well as ED return precautions have been discussed with the patient and the patient has verbalized an understanding of the information. All questions or concerns have been addressed.     This case was discussed with Dr. Aguirre who is in agreement with my assessment and plan.            Scribe Attestation:   Scribe #1: I performed the above scribed service and the documentation accurately describes the services I performed. I attest to the accuracy of the note.    Attending Attestation:     Physician Attestation Statement for NP/PA:   I discussed this assessment and plan of this patient with the NP/PA, but I did not personally examine the patient. The face to face encounter was performed by the NP/PA.        Physician Attestation for Scribe:  Physician Attestation Statement for Scribe #1: I, Nona Sosa NP, reviewed documentation, as scribed by Michelle Murray in my presence, and it is both accurate and complete.                    Clinical Impression:   The encounter diagnosis was Nonintractable headache, unspecified chronicity  pattern, unspecified headache type.      Disposition:   Disposition: Discharged  Condition: Stable                        Nona Sosa NP  09/12/18 8726       Shaq Aguirre MD  09/13/18 0902

## 2018-09-13 NOTE — DISCHARGE INSTRUCTIONS

## 2018-09-19 ENCOUNTER — HOSPITAL ENCOUNTER (EMERGENCY)
Facility: HOSPITAL | Age: 43
Discharge: HOME | End: 2018-09-19
Attending: EMERGENCY MEDICINE
Payer: MEDICAID

## 2018-09-19 VITALS
WEIGHT: 150 LBS | BODY MASS INDEX: 27.6 KG/M2 | OXYGEN SATURATION: 99 % | HEART RATE: 71 BPM | TEMPERATURE: 98 F | RESPIRATION RATE: 18 BRPM | DIASTOLIC BLOOD PRESSURE: 100 MMHG | SYSTOLIC BLOOD PRESSURE: 146 MMHG | HEIGHT: 62 IN

## 2018-09-19 DIAGNOSIS — R03.0 ELEVATED BLOOD PRESSURE READING: ICD-10-CM

## 2018-09-19 DIAGNOSIS — R51.9 NONINTRACTABLE HEADACHE, UNSPECIFIED CHRONICITY PATTERN, UNSPECIFIED HEADACHE TYPE: Primary | ICD-10-CM

## 2018-09-19 LAB
B-HCG UR QL: NEGATIVE
BACTERIA #/AREA URNS HPF: NORMAL /HPF
BILIRUB UR QL STRIP: NEGATIVE
CLARITY UR: CLEAR
COLOR UR: YELLOW
CTP QC/QA: YES
GLUCOSE UR QL STRIP: NEGATIVE
HGB UR QL STRIP: ABNORMAL
KETONES UR QL STRIP: NEGATIVE
LEUKOCYTE ESTERASE UR QL STRIP: NEGATIVE
MICROSCOPIC COMMENT: NORMAL
NITRITE UR QL STRIP: NEGATIVE
PH UR STRIP: 5 [PH] (ref 5–8)
PROT UR QL STRIP: NEGATIVE
RBC #/AREA URNS HPF: 2 /HPF (ref 0–4)
SP GR UR STRIP: 1.02 (ref 1–1.03)
SQUAMOUS #/AREA URNS HPF: 12 /HPF
URN SPEC COLLECT METH UR: ABNORMAL
UROBILINOGEN UR STRIP-ACNC: NEGATIVE EU/DL
WBC #/AREA URNS HPF: 2 /HPF (ref 0–5)

## 2018-09-19 PROCEDURE — 81025 URINE PREGNANCY TEST: CPT | Performed by: NURSE PRACTITIONER

## 2018-09-19 PROCEDURE — 81000 URINALYSIS NONAUTO W/SCOPE: CPT

## 2018-09-19 PROCEDURE — 99283 EMERGENCY DEPT VISIT LOW MDM: CPT

## 2018-09-19 PROCEDURE — 25000003 PHARM REV CODE 250: Performed by: NURSE PRACTITIONER

## 2018-09-19 RX ORDER — ACETAMINOPHEN 325 MG/1
650 TABLET ORAL
Status: COMPLETED | OUTPATIENT
Start: 2018-09-19 | End: 2018-09-19

## 2018-09-19 RX ORDER — BUTALBITAL, ACETAMINOPHEN AND CAFFEINE 50; 325; 40 MG/1; MG/1; MG/1
1 TABLET ORAL EVERY 6 HOURS PRN
Qty: 20 TABLET | Refills: 0 | Status: SHIPPED | OUTPATIENT
Start: 2018-09-19 | End: 2018-10-19

## 2018-09-19 RX ADMIN — ACETAMINOPHEN 650 MG: 325 TABLET, FILM COATED ORAL at 10:09

## 2018-09-20 NOTE — DISCHARGE INSTRUCTIONS
Please follow up with primary care for re-evaluation of your blood pressure.    Please return to the Emergency Department for any new or worsening symptoms including:  Sudden worsening of headache, vision changes, changes in location and type of year headache, fever, chest pain, shortness of breath, loss of consciousness, dizziness, weakness, or any other concerns.     Please follow up with your Primary Care Provider within in the week. If you do not have one, you may contact the one listed on your discharge paperwork or you may also call the [facility] Appointment Desk at [phone] to schedule an appointment with one.     Please take all medication as prescribed.

## 2018-09-20 NOTE — ED PROVIDER NOTES
"Encounter Date: 9/19/2018     SORT MSE:  Pt is a 43 y.o. female who presents for emergent consideration for headache. Pt will be moved to room when one is available, otherwise will wait in waiting room with triage nurse supervision.  Pt arrived by ambulatory. She is not in distress. Orders have been placed. BERNABE Elliott DNP ACNP-BC 9/19/2018 9:32 PM     SCRIBE #1 NOTE: I, Dana Hernandez, am scribing for, and in the presence of,  Bert Woodward - [facility]. I have scribed the following portions of the note - Other sections scribed: HPI, ROS.       History     Chief Complaint   Patient presents with    Headache     Pt c/o headache "On and off since Saturday".  Denies taking pain medication.  States she was just seen here for the same problem.      CC: HA    44 y/o female with Anemia, Encounter for blood transfusion, History of seizure disorder, HTN, and seizures presents to the ED c/o gradual onset posterior HA with associated photophobia that started x4 days ago. The pain is severe (8/10) and intermittent. The patient thinks her symptoms are due to her high blood pressure. The patient states that she's been out of her HTN medications since April 2018 because her PCP passed away. She's currently in the process of finding a new PCP. The patient presented to this facility on 9/12/18 for similar symptoms, where she had CT scan of her head with no significant findings. The patient denies rhinorrhea, sore throat, cough, fever, chills, or N/V/D. No other symptoms reported.      The history is provided by the patient. No  was used.     Review of patient's allergies indicates:  No Known Allergies  Past Medical History:   Diagnosis Date    Anemia     Encounter for blood transfusion     History of seizure disorder     HTN (hypertension)     Seizures     last one when she was 20 years old     Past Surgical History:   Procedure Laterality Date    HERNIA REPAIR      TUBAL LIGATION       Family History "   Problem Relation Age of Onset    Asthma Mother     Asthma Sister      Social History     Tobacco Use    Smoking status: Never Smoker    Smokeless tobacco: Never Used   Substance Use Topics    Alcohol use: Yes     Comment: occasional    Drug use: No     Review of Systems   Constitutional: Negative for chills and fever.   HENT: Negative for congestion, ear pain, rhinorrhea and sore throat.    Eyes: Positive for photophobia. Negative for redness.   Respiratory: Negative for cough and shortness of breath.    Cardiovascular: Negative for chest pain.   Gastrointestinal: Negative for abdominal pain, diarrhea, nausea and vomiting.   Genitourinary: Negative for dysuria.   Musculoskeletal: Negative for back pain and neck pain.   Skin: Negative for rash.   Neurological: Positive for headaches (upper/posterior head). Negative for dizziness, seizures, syncope, facial asymmetry, speech difficulty, weakness, light-headedness and numbness.   Psychiatric/Behavioral: Negative for confusion.       Physical Exam     Initial Vitals [09/19/18 2133]   BP Pulse Resp Temp SpO2   (!) 161/99 73 16 98.4 °F (36.9 °C) 99 %      MAP       --         Physical Exam    Nursing note and vitals reviewed.  Constitutional: She appears well-developed and well-nourished. She is not diaphoretic. She is cooperative.  Non-toxic appearance. She does not have a sickly appearance. She does not appear ill. No distress.   HENT:   Head: Normocephalic and atraumatic.       Right Ear: Tympanic membrane and external ear normal.   Left Ear: Tympanic membrane and external ear normal.   Nose: Nose normal.   Mouth/Throat: Uvula is midline and oropharynx is clear and moist. No trismus in the jaw.   Location of the patient's head pain.   Eyes: Conjunctivae and EOM are normal. Pupils are equal, round, and reactive to light.   Neck: Normal range of motion, full passive range of motion without pain and phonation normal. Neck supple. No tracheal deviation and normal  range of motion present. No neck rigidity.   Cardiovascular: Normal rate, regular rhythm and normal heart sounds. Exam reveals no gallop and no friction rub.    No murmur heard.  Pulses:       Radial pulses are 2+ on the right side, and 2+ on the left side.   Pulmonary/Chest: Effort normal and breath sounds normal. No stridor. No tachypnea and no bradypnea. No respiratory distress. She has no wheezes. She has no rhonchi. She has no rales. She exhibits no tenderness.   Abdominal: There is no tenderness.   Musculoskeletal: Normal range of motion.   Lymphadenopathy:     She has no cervical adenopathy.        Right cervical: No superficial cervical adenopathy present.       Left cervical: No superficial cervical adenopathy present.   Neurological: She is alert and oriented to person, place, and time. She has normal strength. No cranial nerve deficit or sensory deficit. Coordination and gait normal. GCS eye subscore is 4. GCS verbal subscore is 5. GCS motor subscore is 6.   She is awake, alert, and oriented x3. PERRL. EOM's Intact. Gross visual acuity is intact. No tongue deviation or slurred speech. Bilateral facial movement is symmetrical. Symmetrical shoulder shrug and head moves appropriately side to side. Sensation is intact and symmetric to face, upper, and lower extremities. Patient hears commands and appropriately responds. Equal strength with upper and lower extremities. No truncal ataxia. Ambulates with a stead and even gait.     Cervical Nerve Exam Normal: Equal strength with upper extremities (thumbs up/down/side, fingers spread, wrist and elbow flexion/extension, arms crossed).    Skin: Skin is warm, dry and intact. No rash noted. No cyanosis or erythema. Nails show no clubbing.   Psychiatric: She has a normal mood and affect. Her behavior is normal. Thought content normal.         ED Course   Procedures  Labs Reviewed   URINALYSIS, REFLEX TO URINE CULTURE - Abnormal; Notable for the following components:        Result Value    Occult Blood UA 3+ (*)     All other components within normal limits   URINALYSIS MICROSCOPIC   POCT URINE PREGNANCY          Imaging Results    None          Medical Decision Making:   History:   Old Medical Records: I decided to obtain old medical records.  Old Records Summarized: records from previous admission(s).       <> Summary of Records: CT scan from 09/12/2018 reviewed with no acute intracranial findings.       APC / Resident Notes:   This is an evaluation of a 43 y.o. female that presents to the Emergency Department for headache. Her headache is more upper posterior head.  She reports no neck pain or stiffness, no lower occipital headache. The patient is a non-toxic, afebrile, and well appearing female. On physical exam she has no focal weakness or neurological deficits. Has full ROM of neck with no nuchal rigidity or meningeal signs.  She has equal strength and sensation to upper and lower extremities. No facial asymmetry or facial droop. There is no staggering or ataxic gait, vomiting, double vision, visual loss, slurred speech, numbness of the face or body, weakness, clumsiness, or incoordination. Vital Signs are Reassuring. RESULTS:  UPT negative.  Urinalysis 3+ occult blood were the patient is currently on her menstrual cycle.     Given the above findings, my overall impression is headache and elevated blood pressure. Given the above findings, I do not think the patient has meningitis, SAH\ICH, intracranial mass, neoplasm.  She has been off her blood pressure medication for several months, her blood pressure is mildly elevated emergency department today, I will have her follow up with her primary care doctor for reestablishment of blood pressure medication.    DC Meds:  Fioricet. Additional recommendations hydration, Tylenol/ibuprofen as needed, do not take additional Tylenol with Fioricet. The diagnosis, treatment plan, instructions for follow-up and reevaluation with PCP as well  as ED return precautions have been discussed with the patient and the patient has verbalized an understanding of the information. All questions or concerns have been addressed. This case was discussed with Dr. Capone who is in agreement with my assessment and plan. KAMINI Sutton, MARSHALL        Scribe Attestation:   Scribe #1: I performed the above scribed service and the documentation accurately describes the services I performed. I attest to the accuracy of the note.    Attending Attestation:           Physician Attestation for Scribe:  Physician Attestation Statement for Scribe #1: I, Bert Woodward - FLAVIO, reviewed documentation, as scribed by Dana Hernandez in my presence, and it is both accurate and complete.                    Clinical Impression:   The primary encounter diagnosis was Nonintractable headache, unspecified chronicity pattern, unspecified headache type. A diagnosis of Elevated blood pressure reading was also pertinent to this visit.      Disposition:   Disposition: Discharged  Condition: Stable                        FLAVIO Thomas  09/20/18 0313       FLAVIO Thomas  09/20/18 0314

## 2019-02-12 ENCOUNTER — HOSPITAL ENCOUNTER (OUTPATIENT)
Facility: HOSPITAL | Age: 44
Discharge: HOME | End: 2019-02-13
Attending: EMERGENCY MEDICINE | Admitting: HOSPITALIST
Payer: MEDICAID

## 2019-02-12 DIAGNOSIS — M54.2 NECK PAIN: Primary | ICD-10-CM

## 2019-02-12 DIAGNOSIS — I10 ESSENTIAL HYPERTENSION, BENIGN: ICD-10-CM

## 2019-02-12 DIAGNOSIS — R29.898 WEAKNESS OF LEFT UPPER EXTREMITY: ICD-10-CM

## 2019-02-12 DIAGNOSIS — R29.898 WEAKNESS OF LEFT LOWER EXTREMITY: ICD-10-CM

## 2019-02-12 LAB
ALBUMIN SERPL BCP-MCNC: 3.9 G/DL
ALP SERPL-CCNC: 65 U/L
ALT SERPL W/O P-5'-P-CCNC: 17 U/L
ANION GAP SERPL CALC-SCNC: 5 MMOL/L
AST SERPL-CCNC: 18 U/L
B-HCG UR QL: NEGATIVE
BASOPHILS # BLD AUTO: 0.02 K/UL
BASOPHILS NFR BLD: 0.2 %
BILIRUB SERPL-MCNC: 0.8 MG/DL
BILIRUB UR QL STRIP: NEGATIVE
BUN SERPL-MCNC: 12 MG/DL
CALCIUM SERPL-MCNC: 9.5 MG/DL
CHLORIDE SERPL-SCNC: 109 MMOL/L
CLARITY UR: CLEAR
CO2 SERPL-SCNC: 23 MMOL/L
COLOR UR: YELLOW
CREAT SERPL-MCNC: 0.8 MG/DL
CTP QC/QA: YES
DIFFERENTIAL METHOD: ABNORMAL
EOSINOPHIL # BLD AUTO: 0.2 K/UL
EOSINOPHIL NFR BLD: 1.8 %
ERYTHROCYTE [DISTWIDTH] IN BLOOD BY AUTOMATED COUNT: 15 %
EST. GFR  (AFRICAN AMERICAN): >60 ML/MIN/1.73 M^2
EST. GFR  (NON AFRICAN AMERICAN): >60 ML/MIN/1.73 M^2
GLUCOSE SERPL-MCNC: 83 MG/DL
GLUCOSE UR QL STRIP: NEGATIVE
HCT VFR BLD AUTO: 34.5 %
HGB BLD-MCNC: 10.8 G/DL
HGB UR QL STRIP: NEGATIVE
KETONES UR QL STRIP: NEGATIVE
LEUKOCYTE ESTERASE UR QL STRIP: NEGATIVE
LYMPHOCYTES # BLD AUTO: 2.4 K/UL
LYMPHOCYTES NFR BLD: 25.2 %
MCH RBC QN AUTO: 27.3 PG
MCHC RBC AUTO-ENTMCNC: 31.3 G/DL
MCV RBC AUTO: 87 FL
MONOCYTES # BLD AUTO: 1 K/UL
MONOCYTES NFR BLD: 10.7 %
NEUTROPHILS # BLD AUTO: 6 K/UL
NEUTROPHILS NFR BLD: 62.1 %
NITRITE UR QL STRIP: NEGATIVE
PH UR STRIP: 6 [PH] (ref 5–8)
PLATELET # BLD AUTO: 241 K/UL
PMV BLD AUTO: 10 FL
POCT GLUCOSE: 111 MG/DL (ref 70–110)
POCT GLUCOSE: 95 MG/DL (ref 70–110)
POTASSIUM SERPL-SCNC: 3.9 MMOL/L
PROT SERPL-MCNC: 8 G/DL
PROT UR QL STRIP: NEGATIVE
RBC # BLD AUTO: 3.96 M/UL
SODIUM SERPL-SCNC: 137 MMOL/L
SP GR UR STRIP: 1.02 (ref 1–1.03)
URN SPEC COLLECT METH UR: NORMAL
UROBILINOGEN UR STRIP-ACNC: NEGATIVE EU/DL
WBC # BLD AUTO: 9.69 K/UL

## 2019-02-12 PROCEDURE — 84439 ASSAY OF FREE THYROXINE: CPT

## 2019-02-12 PROCEDURE — 63600175 PHARM REV CODE 636 W HCPCS: Performed by: EMERGENCY MEDICINE

## 2019-02-12 PROCEDURE — 85025 COMPLETE CBC W/AUTO DIFF WBC: CPT

## 2019-02-12 PROCEDURE — 96374 THER/PROPH/DIAG INJ IV PUSH: CPT

## 2019-02-12 PROCEDURE — 81025 URINE PREGNANCY TEST: CPT | Performed by: NURSE PRACTITIONER

## 2019-02-12 PROCEDURE — G0378 HOSPITAL OBSERVATION PER HR: HCPCS

## 2019-02-12 PROCEDURE — 25000003 PHARM REV CODE 250: Performed by: EMERGENCY MEDICINE

## 2019-02-12 PROCEDURE — 80061 LIPID PANEL: CPT

## 2019-02-12 PROCEDURE — 82962 GLUCOSE BLOOD TEST: CPT

## 2019-02-12 PROCEDURE — 80053 COMPREHEN METABOLIC PANEL: CPT

## 2019-02-12 PROCEDURE — 99285 EMERGENCY DEPT VISIT HI MDM: CPT | Mod: 25

## 2019-02-12 PROCEDURE — A4216 STERILE WATER/SALINE, 10 ML: HCPCS | Performed by: EMERGENCY MEDICINE

## 2019-02-12 PROCEDURE — 83036 HEMOGLOBIN GLYCOSYLATED A1C: CPT

## 2019-02-12 PROCEDURE — 81003 URINALYSIS AUTO W/O SCOPE: CPT | Mod: 59

## 2019-02-12 PROCEDURE — 84443 ASSAY THYROID STIM HORMONE: CPT

## 2019-02-12 PROCEDURE — 80307 DRUG TEST PRSMV CHEM ANLYZR: CPT

## 2019-02-12 RX ORDER — ASPIRIN 81 MG/1
81 TABLET ORAL DAILY
Status: DISCONTINUED | OUTPATIENT
Start: 2019-02-13 | End: 2019-02-13 | Stop reason: HOSPADM

## 2019-02-12 RX ORDER — KETOROLAC TROMETHAMINE 30 MG/ML
15 INJECTION, SOLUTION INTRAMUSCULAR; INTRAVENOUS
Status: COMPLETED | OUTPATIENT
Start: 2019-02-12 | End: 2019-02-12

## 2019-02-12 RX ORDER — SODIUM CHLORIDE 0.9 % (FLUSH) 0.9 %
3 SYRINGE (ML) INJECTION EVERY 8 HOURS
Status: DISCONTINUED | OUTPATIENT
Start: 2019-02-12 | End: 2019-02-13 | Stop reason: HOSPADM

## 2019-02-12 RX ORDER — LORAZEPAM 2 MG/ML
2 INJECTION INTRAMUSCULAR
Status: DISCONTINUED | OUTPATIENT
Start: 2019-02-12 | End: 2019-02-13

## 2019-02-12 RX ORDER — AMLODIPINE BESYLATE 5 MG/1
5 TABLET ORAL
Status: DISCONTINUED | OUTPATIENT
Start: 2019-02-12 | End: 2019-02-12

## 2019-02-12 RX ORDER — ATORVASTATIN CALCIUM 40 MG/1
40 TABLET, FILM COATED ORAL DAILY
Status: DISCONTINUED | OUTPATIENT
Start: 2019-02-13 | End: 2019-02-13 | Stop reason: HOSPADM

## 2019-02-12 RX ORDER — PANTOPRAZOLE SODIUM 40 MG/1
40 TABLET, DELAYED RELEASE ORAL DAILY
Status: DISCONTINUED | OUTPATIENT
Start: 2019-02-13 | End: 2019-02-13 | Stop reason: HOSPADM

## 2019-02-12 RX ORDER — HYDRALAZINE HYDROCHLORIDE 20 MG/ML
10 INJECTION INTRAMUSCULAR; INTRAVENOUS EVERY 6 HOURS PRN
Status: DISCONTINUED | OUTPATIENT
Start: 2019-02-12 | End: 2019-02-13

## 2019-02-12 RX ORDER — CYCLOBENZAPRINE HCL 10 MG
10 TABLET ORAL
Status: COMPLETED | OUTPATIENT
Start: 2019-02-12 | End: 2019-02-12

## 2019-02-12 RX ORDER — ONDANSETRON 2 MG/ML
4 INJECTION INTRAMUSCULAR; INTRAVENOUS EVERY 8 HOURS PRN
Status: DISCONTINUED | OUTPATIENT
Start: 2019-02-12 | End: 2019-02-13 | Stop reason: HOSPADM

## 2019-02-12 RX ORDER — ASPIRIN 325 MG
325 TABLET ORAL
Status: COMPLETED | OUTPATIENT
Start: 2019-02-12 | End: 2019-02-12

## 2019-02-12 RX ADMIN — KETOROLAC TROMETHAMINE 15 MG: 30 INJECTION, SOLUTION INTRAMUSCULAR at 05:02

## 2019-02-12 RX ADMIN — ASPIRIN 325 MG ORAL TABLET 325 MG: 325 PILL ORAL at 09:02

## 2019-02-12 RX ADMIN — Medication 3 ML: at 11:02

## 2019-02-12 RX ADMIN — CYCLOBENZAPRINE HYDROCHLORIDE 10 MG: 10 TABLET, FILM COATED ORAL at 05:02

## 2019-02-12 NOTE — ED TRIAGE NOTES
Pt arrived to the ED due to left sided weakness that started this morning and a h/a that started last night. Pt reports current left sided h/a. Pt has increased weakness to her right hand but no drift. Pt has drift to her LLE    - drift to left leg   - no numbness   - no drift in upper extremities   - no visual changes
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
## 2019-02-13 VITALS
SYSTOLIC BLOOD PRESSURE: 149 MMHG | HEIGHT: 61 IN | BODY MASS INDEX: 30.55 KG/M2 | TEMPERATURE: 98 F | OXYGEN SATURATION: 100 % | DIASTOLIC BLOOD PRESSURE: 97 MMHG | WEIGHT: 161.81 LBS | RESPIRATION RATE: 16 BRPM | HEART RATE: 67 BPM

## 2019-02-13 LAB
ALBUMIN SERPL BCP-MCNC: 3.4 G/DL
ALP SERPL-CCNC: 59 U/L
ALT SERPL W/O P-5'-P-CCNC: 16 U/L
AMPHET+METHAMPHET UR QL: NEGATIVE
ANION GAP SERPL CALC-SCNC: 4 MMOL/L
APTT BLDCRRT: 28.5 SEC
AST SERPL-CCNC: 16 U/L
BARBITURATES UR QL SCN>200 NG/ML: NEGATIVE
BASOPHILS # BLD AUTO: 0.01 K/UL
BASOPHILS NFR BLD: 0.1 %
BENZODIAZ UR QL SCN>200 NG/ML: NEGATIVE
BILIRUB SERPL-MCNC: 1.1 MG/DL
BUN SERPL-MCNC: 13 MG/DL
BZE UR QL SCN: NEGATIVE
CALCIUM SERPL-MCNC: 8.6 MG/DL
CANNABINOIDS UR QL SCN: NORMAL
CHLORIDE SERPL-SCNC: 111 MMOL/L
CHOLEST SERPL-MCNC: 165 MG/DL
CHOLEST/HDLC SERPL: 3.1 {RATIO}
CK MB SERPL-MCNC: 1 NG/ML
CK MB SERPL-RTO: 0.8 %
CK SERPL-CCNC: 127 U/L
CO2 SERPL-SCNC: 22 MMOL/L
CREAT SERPL-MCNC: 0.9 MG/DL
CREAT UR-MCNC: 112.6 MG/DL
DIFFERENTIAL METHOD: ABNORMAL
EOSINOPHIL # BLD AUTO: 0.1 K/UL
EOSINOPHIL NFR BLD: 2.1 %
ERYTHROCYTE [DISTWIDTH] IN BLOOD BY AUTOMATED COUNT: 15.1 %
EST. GFR  (AFRICAN AMERICAN): >60 ML/MIN/1.73 M^2
EST. GFR  (NON AFRICAN AMERICAN): >60 ML/MIN/1.73 M^2
ESTIMATED AVG GLUCOSE: 94 MG/DL
GLUCOSE SERPL-MCNC: 94 MG/DL
HBA1C MFR BLD HPLC: 4.9 %
HCT VFR BLD AUTO: 38.5 %
HDLC SERPL-MCNC: 53 MG/DL
HDLC SERPL: 32.1 %
HGB BLD-MCNC: 11.9 G/DL
INR PPP: 1.1
LDLC SERPL CALC-MCNC: 98.8 MG/DL
LYMPHOCYTES # BLD AUTO: 2.2 K/UL
LYMPHOCYTES NFR BLD: 32.5 %
MAGNESIUM SERPL-MCNC: 2 MG/DL
MCH RBC QN AUTO: 27.3 PG
MCHC RBC AUTO-ENTMCNC: 30.9 G/DL
MCV RBC AUTO: 88 FL
METHADONE UR QL SCN>300 NG/ML: NEGATIVE
MONOCYTES # BLD AUTO: 0.6 K/UL
MONOCYTES NFR BLD: 8.6 %
NEUTROPHILS # BLD AUTO: 3.8 K/UL
NEUTROPHILS NFR BLD: 56.7 %
NONHDLC SERPL-MCNC: 112 MG/DL
OPIATES UR QL SCN: NEGATIVE
PCP UR QL SCN>25 NG/ML: NEGATIVE
PHOSPHATE SERPL-MCNC: 3.3 MG/DL
PLATELET # BLD AUTO: 248 K/UL
PMV BLD AUTO: 10.4 FL
POTASSIUM SERPL-SCNC: 4 MMOL/L
PROT SERPL-MCNC: 7.2 G/DL
PROTHROMBIN TIME: 11.1 SEC
RBC # BLD AUTO: 4.36 M/UL
SODIUM SERPL-SCNC: 137 MMOL/L
T4 FREE SERPL-MCNC: 0.9 NG/DL
TOXICOLOGY INFORMATION: NORMAL
TRIGL SERPL-MCNC: 66 MG/DL
TROPONIN I SERPL DL<=0.01 NG/ML-MCNC: <0.006 NG/ML
TSH SERPL DL<=0.005 MIU/L-ACNC: 5.87 UIU/ML
WBC # BLD AUTO: 6.76 K/UL

## 2019-02-13 PROCEDURE — A4216 STERILE WATER/SALINE, 10 ML: HCPCS | Performed by: EMERGENCY MEDICINE

## 2019-02-13 PROCEDURE — 84484 ASSAY OF TROPONIN QUANT: CPT

## 2019-02-13 PROCEDURE — 84100 ASSAY OF PHOSPHORUS: CPT

## 2019-02-13 PROCEDURE — 80053 COMPREHEN METABOLIC PANEL: CPT

## 2019-02-13 PROCEDURE — 25000003 PHARM REV CODE 250: Performed by: NURSE PRACTITIONER

## 2019-02-13 PROCEDURE — 85025 COMPLETE CBC W/AUTO DIFF WBC: CPT

## 2019-02-13 PROCEDURE — G0378 HOSPITAL OBSERVATION PER HR: HCPCS

## 2019-02-13 PROCEDURE — 25000003 PHARM REV CODE 250: Performed by: EMERGENCY MEDICINE

## 2019-02-13 PROCEDURE — 96375 TX/PRO/DX INJ NEW DRUG ADDON: CPT

## 2019-02-13 PROCEDURE — 63600175 PHARM REV CODE 636 W HCPCS: Performed by: NURSE PRACTITIONER

## 2019-02-13 PROCEDURE — 85610 PROTHROMBIN TIME: CPT

## 2019-02-13 PROCEDURE — 82553 CREATINE MB FRACTION: CPT

## 2019-02-13 PROCEDURE — 85730 THROMBOPLASTIN TIME PARTIAL: CPT

## 2019-02-13 PROCEDURE — 36415 COLL VENOUS BLD VENIPUNCTURE: CPT

## 2019-02-13 PROCEDURE — 83735 ASSAY OF MAGNESIUM: CPT

## 2019-02-13 PROCEDURE — 82550 ASSAY OF CK (CPK): CPT

## 2019-02-13 RX ORDER — AMLODIPINE AND OLMESARTAN MEDOXOMIL 5; 20 MG/1; MG/1
1 TABLET ORAL DAILY
Qty: 30 TABLET | Refills: 6 | Status: SHIPPED | OUTPATIENT
Start: 2019-02-13 | End: 2020-02-13

## 2019-02-13 RX ORDER — ENOXAPARIN SODIUM 100 MG/ML
40 INJECTION SUBCUTANEOUS EVERY 24 HOURS
Status: DISCONTINUED | OUTPATIENT
Start: 2019-02-13 | End: 2019-02-13 | Stop reason: HOSPADM

## 2019-02-13 RX ORDER — LORAZEPAM 2 MG/ML
0.5 INJECTION INTRAMUSCULAR
Status: DISCONTINUED | OUTPATIENT
Start: 2019-02-13 | End: 2019-02-13 | Stop reason: HOSPADM

## 2019-02-13 RX ORDER — AMLODIPINE BESYLATE 5 MG/1
10 TABLET ORAL DAILY
Status: DISCONTINUED | OUTPATIENT
Start: 2019-02-13 | End: 2019-02-13 | Stop reason: HOSPADM

## 2019-02-13 RX ORDER — OLMESARTAN MEDOXOMIL 20 MG/1
20 TABLET ORAL DAILY
Status: DISCONTINUED | OUTPATIENT
Start: 2019-02-13 | End: 2019-02-13 | Stop reason: HOSPADM

## 2019-02-13 RX ADMIN — ATORVASTATIN CALCIUM 40 MG: 40 TABLET, FILM COATED ORAL at 09:02

## 2019-02-13 RX ADMIN — LORAZEPAM 0.5 MG: 2 INJECTION, SOLUTION INTRAMUSCULAR; INTRAVENOUS at 09:02

## 2019-02-13 RX ADMIN — PANTOPRAZOLE SODIUM 40 MG: 40 TABLET, DELAYED RELEASE ORAL at 09:02

## 2019-02-13 RX ADMIN — ASPIRIN 81 MG: 81 TABLET, COATED ORAL at 09:02

## 2019-02-13 RX ADMIN — Medication 3 ML: at 06:02

## 2019-02-13 RX ADMIN — AMLODIPINE BESYLATE 10 MG: 5 TABLET ORAL at 09:02

## 2019-02-13 RX ADMIN — OLMESARTAN MEDOXOMIL 20 MG: 20 TABLET, FILM COATED ORAL at 09:02

## 2019-02-13 NOTE — PLAN OF CARE
02/13/19 1038   Final Note   Assessment Type Final Discharge Note   Anticipated Discharge Disposition Home   What phone number can be called within the next 1-3 days to see how you are doing after discharge? ([phone])   Hospital Follow Up  Appt(s) scheduled? No  (NO PCP.  Referred to [facility].  ([facility] requires self scheduling))   Discharge plans and expectations educations in teach back method with documentation complete? Yes   Right Care Referral Info   Post Acute Recommendation No Care

## 2019-02-13 NOTE — PLAN OF CARE
To patient's room to discuss patient managing her care at home.      TN Role Explained.  Patient identified by using 2 identifiers:  Name and date of birth    Patient stated that her mother WILL HELP AT HOME WITH her RECOVERY.      TN name and contact info placed on the communication board    Preferred Pharmacy:  [facility]     02/13/19 1027   Discharge Assessment   Assessment Type Discharge Planning Assessment   Confirmed/corrected address and phone number on facesheet? Yes   Assessment information obtained from? Patient   Expected Length of Stay (days) (discharged)   Communicated expected length of stay with patient/caregiver yes   Prior to hospitilization cognitive status: Alert/Oriented   Prior to hospitalization functional status: Independent   Current cognitive status: Alert/Oriented   Current Functional Status: Independent   Lives With child(remi), dependent;child(remi), adult   Able to Return to Prior Arrangements yes   Is patient able to care for self after discharge? Yes   Patient's perception of discharge disposition home or selfcare   Readmission Within the Last 30 Days no previous admission in last 30 days   Patient currently being followed by outpatient case management? No   Patient currently receives any other outside agency services? No   Equipment Currently Used at Home none   Do you have any problems affording any of your prescribed medications? Yes   If yes, what medications? muscle relaxers and pain meds   Is the patient taking medications as prescribed? yes  (except above)   Does the patient have transportation home? Yes   Transportation Anticipated car, drives self   Does the patient receive services at the [facility]? No   Discharge Plan A Home with family   DME Needed Upon Discharge  none   Patient/Family in Agreement with Plan yes

## 2019-02-13 NOTE — CARE UPDATE
Nurse reports that the patient feels loopy since ativan 0.5 mg that she received for MRI testing. Patient received IV dose at 906am. Recommended that when the patient is alert, appropriate and able to ambulate about the room without issue it is ok for discharge.  -If not she can notify family for assistance with driving or getting home as opposed to driving herself.    KAMINI Gil, FNP-C  Hospitalist - Department of Hospital Medicine  [address], [place] [address]  Office [phone]; Pager [phone]
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
## 2019-02-13 NOTE — PLAN OF CARE
02/13/19 1037   Post-Acute Status   Post-Acute Authorization (home self care)   EDUCATION:  Things You are responsible For To Manage Your Care At Home:  1.    Getting your prescriptions filled   2.    Taking your medications as directed, DO NOT MISS ANY DOSES!  3.    Going to your follow-up doctor appointment. This is important because it  allow the doctor to monitor your progress and determine if  any changes need to made to your treatment plan.  Call [facility] number for new or repeated problems / symptoms   Call 911 for CP and / or SOB   Patient agreed to all above    Josie Arthur notified that all CM needs are met

## 2019-02-13 NOTE — NURSING
Report rec'd from ORLANDO Ash. DX: weakness LLE, r/o stroke. AAOx4. Skin intact. Hx: med noncompliance. LBM 2/12/19.
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
## 2019-02-13 NOTE — PT/OT/SLP PROGRESS
Physical Therapy      Patient Name:  Carol Yarbrough   MRN:  [id number]    0924 Patient not seen at this time for PT eval secondary to Unavailable (pt off unit for medical procedures). Will follow-up as able.    1051 Per FLAVIO Miranda, pt is stable for discharge home with no therapy needs.  No PT eval needed at this time.      Carina Ward, PT

## 2019-02-13 NOTE — ASSESSMENT & PLAN NOTE
Appears poorly controlled patient's blood pressure was 185/118 at the time of admission.  She takes amlodipine-olmesartant 5-20 at home  -resume home meds with increase in amlodipine to 10 mg  -adjust as warranted
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
## 2019-02-13 NOTE — SUBJECTIVE & OBJECTIVE
Past Medical History:   Diagnosis Date    Anemia     Encounter for blood transfusion     History of seizure disorder     HTN (hypertension)     Seizures     last one when she was 20 years old       Past Surgical History:   Procedure Laterality Date    HERNIA REPAIR      TUBAL LIGATION         Review of patient's allergies indicates:  No Known Allergies    No current facility-administered medications on file prior to encounter.      Current Outpatient Medications on File Prior to Encounter   Medication Sig    amlodipine-olmesartan (ARMAAN) 5-20 mg per tablet Take 1 tablet by mouth once daily.    ibuprofen (ADVIL,MOTRIN) 800 MG tablet Take 1 tablet (800 mg total) by mouth every 6 (six) hours as needed for Pain.    lidocaine (LIDODERM) 5 % Place 1 patch onto the skin once daily. Remove & Discard patch within 12 hours or as directed by MD. May use 4% formulation if more affordable for patient.    meloxicam (MOBIC) 7.5 MG tablet Take 1 tablet (7.5 mg total) by mouth once daily.    meloxicam (MOBIC) 7.5 MG tablet Take 1 tablet (7.5 mg total) by mouth once daily.    naproxen (NAPROSYN) 500 MG tablet Take 500 mg by mouth 2 (two) times daily.     Family History     Problem Relation (Age of Onset)    Asthma Mother, Sister        Tobacco Use    Smoking status: Never Smoker    Smokeless tobacco: Never Used   Substance and Sexual Activity    Alcohol use: Yes     Comment: occasional    Drug use: No    Sexual activity: Yes     Partners: Male     Review of Systems   Constitutional: Negative for appetite change, chills, diaphoresis and fever.   HENT: Negative for congestion, hearing loss, sore throat, tinnitus and trouble swallowing.    Eyes: Negative for photophobia, discharge, itching and visual disturbance.   Respiratory: Negative for apnea, cough, wheezing and stridor.    Cardiovascular: Negative for chest pain, palpitations and leg swelling.   Gastrointestinal: Negative for abdominal distention, abdominal pain,  blood in stool, constipation, diarrhea and nausea.   Endocrine: Negative for polydipsia, polyphagia and polyuria.   Genitourinary: Negative for difficulty urinating, dysuria, flank pain and frequency.   Musculoskeletal: Negative for arthralgias, joint swelling and neck stiffness.        Lower ext weakness   Skin: Negative for color change, rash and wound.   Neurological: Positive for weakness. Negative for dizziness, tremors, seizures, light-headedness, numbness and headaches.   Hematological: Negative for adenopathy.   Psychiatric/Behavioral: Negative for hallucinations and self-injury.     Objective:     Vital Signs (Most Recent):  Temp: 98 °F (36.7 °C) (02/13/19 0323)  Pulse: (!) 58 (02/13/19 0323)  Resp: 18 (02/13/19 0323)  BP: (!) 138/97 (02/13/19 0323)  SpO2: 100 % (02/13/19 0323) Vital Signs (24h Range):  Temp:  [98 °F (36.7 °C)-98.9 °F (37.2 °C)] 98 °F (36.7 °C)  Pulse:  [58-77] 58  Resp:  [11-23] 18  SpO2:  [97 %-100 %] 100 %  BP: (132-188)/() 138/97     Weight: 73.4 kg (161 lb 13.1 oz)  Body mass index is 30.58 kg/m².    Physical Exam   Constitutional: She is oriented to person, place, and time. She appears well-developed and well-nourished. She is cooperative.   HENT:   Head: Normocephalic and atraumatic.   Eyes: Conjunctivae, EOM and lids are normal. Pupils are equal, round, and reactive to light.   Neck: Normal range of motion and full passive range of motion without pain. Neck supple. No JVD present. No edema present. No thyroid mass present.   Cardiovascular: S1 normal, S2 normal and intact distal pulses.   No murmur heard.  Abdominal: Soft. Bowel sounds are normal. She exhibits no distension and no abdominal bruit. There is no splenomegaly or hepatomegaly. There is no tenderness. There is no CVA tenderness.   Lymphadenopathy:     She has no cervical adenopathy.     She has no axillary adenopathy.   Neurological: She is alert and oriented to person, place, and time. She has normal reflexes. She  displays no tremor. She displays no seizure activity.   Skin: Skin is warm, dry and intact.   Psychiatric: She has a normal mood and affect. Her speech is normal. Thought content normal. Cognition and memory are normal.         CRANIAL NERVES     CN III, IV, VI   Pupils are equal, round, and reactive to light.  Extraocular motions are normal.        Significant Labs:   CBC:   Recent Labs   Lab 02/12/19  1607 02/13/19  0503   WBC 9.69 6.76   HGB 10.8* 11.9*   HCT 34.5* 38.5    248     CMP:   Recent Labs   Lab 02/12/19  1809 02/13/19  0503    137   K 3.9 4.0    111*   CO2 23 22*   GLU 83 94   BUN 12 13   CREATININE 0.8 0.9   CALCIUM 9.5 8.6*   PROT 8.0 7.2   ALBUMIN 3.9 3.4*   BILITOT 0.8 1.1*   ALKPHOS 65 59   AST 18 16   ALT 17 16   ANIONGAP 5* 4*   EGFRNONAA >60 >60     Troponin:   Recent Labs   Lab 02/13/19  0503   TROPONINI <0.006     TSH:   Recent Labs   Lab 02/12/19  1809   TSH 5.874*   Results for AUSTIN KWON (MRN [id number]) as of 2/13/2019 06:40   Ref. Range 2/12/2019 17:00   Specimen UA Unknown Urine, Clean Catch   Color, UA Latest Ref Range: Yellow, Straw, Elizabeth  Yellow   pH, UA Latest Ref Range: 5.0 - 8.0  6.0   Specific Gravity, UA Latest Ref Range: 1.005 - 1.030  1.020   Appearance, UA Latest Ref Range: Clear  Clear   Protein, UA Latest Ref Range: Negative  Negative   Glucose, UA Latest Ref Range: Negative  Negative   Ketones, UA Latest Ref Range: Negative  Negative   Occult Blood UA Latest Ref Range: Negative  Negative   Nitrite, UA Latest Ref Range: Negative  Negative   Urobilinogen, UA Latest Ref Range: <2.0 EU/dL Negative   Bilirubin (UA) Latest Ref Range: Negative  Negative   Leukocytes, UA Latest Ref Range: Negative  Negative     Significant Imaging:   Imaging Results          CT Head Without Contrast (Final result)  Result time 02/12/19 16:03:56    Final result by John Avalos MD (02/12/19 16:03:56)                 Impression:      No acute large vascular territory  infarct or intracranial hemorrhage identified.  Further evaluation/follow-up as warranted.      Electronically signed by: John Avalos MD  Date:    02/12/2019  Time:    16:03             Narrative:    EXAMINATION:  CT HEAD WITHOUT CONTRAST    CLINICAL HISTORY:  Focal neuro deficit, new, fixed or worsening, >6 hours;    TECHNIQUE:  Low dose axial CT images obtained throughout the head without intravenous contrast. Sagittal and coronal reconstructions were performed.    COMPARISON:  Head CT 09/12/2018    FINDINGS:  Intracranial compartment:    Ventricles and sulci are normal in size for age without evidence of hydrocephalus. No extra-axial blood or fluid collections.    The brain parenchyma appears normal. No parenchymal mass, hemorrhage, edema or major vascular distribution infarct.    Skull/extracranial contents (limited evaluation): No fracture. Patchy mucosal thickening of the bilateral ethmoidal air cells and left sphenoid sinus.  The remaining visualized paranasal sinuses and mastoid air cells are clear.
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
## 2019-02-13 NOTE — PROGRESS NOTES
[facility]    WRITTEN HEALTHCARE AND DISCHARGE INFORMATION  Follow-up Information     [facility]. Schedule an appointment as soon as possible for a visit in 1 week.    Why:  for Hospital Follow up  Contact information:  [facility] [id number]  [phone]                       Help at Home           [phone]  After discharge for assistance [facility] On [facility] Care Line 24/7  Assistance     Things You are responsible For To Manage Your Care At Home:  1.    Getting your prescriptions filled   2.    Taking your medications as directed, DO NOT MISS ANY DOSES!  3.    Going to your follow-up doctor appointment. This is important because it  allow the doctor to monitor your progress and determine if  any changes need to made to your treatment plan.     Thank you for choosing [facility] for your care.  Please answer any calls you may receive from [facility] we want to continue to support you as you manage your healthcare needs. [facility] is happy to have the opportunity to serve you.      Sincerely,  Your [facility] Team,  ORLANDO Henderson, ACM-RN; [facility]  [phone]

## 2019-02-13 NOTE — NURSING
Upon entering room patient has a friend at bedside. Patients friend is able to transport her home.  Transport requested.

## 2019-02-13 NOTE — NURSING
Patient to scheduled testing   as ordered. Tele monitoring in progress. Patient awake, alert, oriented. No apparent distress noted.

## 2021-05-10 ENCOUNTER — HOSPITAL ENCOUNTER (EMERGENCY)
Facility: HOSPITAL | Age: 46
Discharge: HOME | End: 2021-05-10
Attending: EMERGENCY MEDICINE
Payer: MEDICAID

## 2021-05-10 VITALS
HEIGHT: 61 IN | WEIGHT: 166 LBS | DIASTOLIC BLOOD PRESSURE: 124 MMHG | HEART RATE: 74 BPM | RESPIRATION RATE: 16 BRPM | SYSTOLIC BLOOD PRESSURE: 192 MMHG | OXYGEN SATURATION: 100 % | TEMPERATURE: 98 F | BODY MASS INDEX: 31.34 KG/M2

## 2021-05-10 DIAGNOSIS — B96.89 BV (BACTERIAL VAGINOSIS): Primary | ICD-10-CM

## 2021-05-10 DIAGNOSIS — N76.0 BV (BACTERIAL VAGINOSIS): Primary | ICD-10-CM

## 2021-05-10 LAB
B-HCG UR QL: NEGATIVE
BACTERIA GENITAL QL WET PREP: ABNORMAL
BILIRUB UR QL STRIP: NEGATIVE
CLARITY UR: CLEAR
CLUE CELLS VAG QL WET PREP: ABNORMAL
COLOR UR: YELLOW
CTP QC/QA: YES
FILAMENT FUNGI VAG WET PREP-#/AREA: ABNORMAL
GLUCOSE UR QL STRIP: NEGATIVE
HGB UR QL STRIP: NEGATIVE
KETONES UR QL STRIP: NEGATIVE
LEUKOCYTE ESTERASE UR QL STRIP: NEGATIVE
NITRITE UR QL STRIP: NEGATIVE
PH UR STRIP: 6 [PH] (ref 5–8)
PROT UR QL STRIP: NEGATIVE
SP GR UR STRIP: 1.03 (ref 1–1.03)
SPECIMEN SOURCE: ABNORMAL
T VAGINALIS GENITAL QL WET PREP: ABNORMAL
URN SPEC COLLECT METH UR: NORMAL
UROBILINOGEN UR STRIP-ACNC: NEGATIVE EU/DL
WBC #/AREA VAG WET PREP: ABNORMAL
YEAST GENITAL QL WET PREP: ABNORMAL

## 2021-05-10 PROCEDURE — 81025 URINE PREGNANCY TEST: CPT | Performed by: NURSE PRACTITIONER

## 2021-05-10 PROCEDURE — 99284 EMERGENCY DEPT VISIT MOD MDM: CPT

## 2021-05-10 PROCEDURE — 87210 SMEAR WET MOUNT SALINE/INK: CPT | Performed by: NURSE PRACTITIONER

## 2021-05-10 PROCEDURE — 81003 URINALYSIS AUTO W/O SCOPE: CPT | Performed by: NURSE PRACTITIONER

## 2021-05-10 RX ORDER — DICYCLOMINE HYDROCHLORIDE 20 MG/1
20 TABLET ORAL 2 TIMES DAILY PRN
Qty: 20 TABLET | Refills: 0 | Status: SHIPPED | OUTPATIENT
Start: 2021-05-10 | End: 2021-06-09

## 2021-05-10 RX ORDER — IBUPROFEN 600 MG/1
600 TABLET ORAL
Status: DISCONTINUED | OUTPATIENT
Start: 2021-05-10 | End: 2021-05-10 | Stop reason: HOSPADM

## 2021-05-10 RX ORDER — IBUPROFEN 600 MG/1
600 TABLET ORAL EVERY 6 HOURS PRN
Qty: 20 TABLET | Refills: 0 | Status: SHIPPED | OUTPATIENT
Start: 2021-05-10 | End: 2022-03-28 | Stop reason: ALTCHOICE

## 2021-05-10 RX ORDER — METRONIDAZOLE 500 MG/1
500 TABLET ORAL 2 TIMES DAILY
Qty: 14 TABLET | Refills: 0 | Status: SHIPPED | OUTPATIENT
Start: 2021-05-10 | End: 2021-05-17

## 2021-08-10 ENCOUNTER — HOSPITAL ENCOUNTER (EMERGENCY)
Facility: HOSPITAL | Age: 46
Discharge: HOME | End: 2021-08-10
Attending: EMERGENCY MEDICINE
Payer: MEDICAID

## 2021-08-10 VITALS
HEART RATE: 71 BPM | TEMPERATURE: 98 F | BODY MASS INDEX: 31.1 KG/M2 | RESPIRATION RATE: 18 BRPM | SYSTOLIC BLOOD PRESSURE: 142 MMHG | DIASTOLIC BLOOD PRESSURE: 88 MMHG | WEIGHT: 169 LBS | OXYGEN SATURATION: 99 % | HEIGHT: 62 IN

## 2021-08-10 DIAGNOSIS — I10 ELEVATED BLOOD PRESSURE READING WITH DIAGNOSIS OF HYPERTENSION: ICD-10-CM

## 2021-08-10 DIAGNOSIS — R51.9 ACUTE NONINTRACTABLE HEADACHE, UNSPECIFIED HEADACHE TYPE: ICD-10-CM

## 2021-08-10 DIAGNOSIS — Z20.822 CLOSE EXPOSURE TO COVID-19 VIRUS: Primary | ICD-10-CM

## 2021-08-10 LAB
B-HCG UR QL: NEGATIVE
CTP QC/QA: YES
CTP QC/QA: YES
SARS-COV-2 RDRP RESP QL NAA+PROBE: NEGATIVE

## 2021-08-10 PROCEDURE — 81025 URINE PREGNANCY TEST: CPT | Performed by: NURSE PRACTITIONER

## 2021-08-10 PROCEDURE — U0002 COVID-19 LAB TEST NON-CDC: HCPCS | Performed by: NURSE PRACTITIONER

## 2021-08-10 PROCEDURE — 99283 EMERGENCY DEPT VISIT LOW MDM: CPT

## 2021-09-14 ENCOUNTER — HOSPITAL ENCOUNTER (EMERGENCY)
Facility: HOSPITAL | Age: 46
Discharge: HOME | End: 2021-09-14
Attending: EMERGENCY MEDICINE
Payer: MEDICAID

## 2021-09-14 VITALS
BODY MASS INDEX: 31.53 KG/M2 | TEMPERATURE: 99 F | WEIGHT: 167 LBS | RESPIRATION RATE: 19 BRPM | DIASTOLIC BLOOD PRESSURE: 97 MMHG | SYSTOLIC BLOOD PRESSURE: 154 MMHG | OXYGEN SATURATION: 100 % | HEART RATE: 77 BPM | HEIGHT: 61 IN

## 2021-09-14 DIAGNOSIS — I10 HYPERTENSION, UNSPECIFIED TYPE: ICD-10-CM

## 2021-09-14 DIAGNOSIS — R05.9 COUGH: Primary | ICD-10-CM

## 2021-09-14 LAB
CTP QC/QA: YES
SARS-COV-2 RDRP RESP QL NAA+PROBE: NEGATIVE

## 2021-09-14 PROCEDURE — 93010 EKG 12-LEAD: ICD-10-PCS | Mod: ,,, | Performed by: INTERNAL MEDICINE

## 2021-09-14 PROCEDURE — U0002 COVID-19 LAB TEST NON-CDC: HCPCS | Performed by: PHYSICIAN ASSISTANT

## 2021-09-14 PROCEDURE — 93005 ELECTROCARDIOGRAM TRACING: CPT

## 2021-09-14 PROCEDURE — 99285 EMERGENCY DEPT VISIT HI MDM: CPT | Mod: 25

## 2021-09-14 PROCEDURE — 25000003 PHARM REV CODE 250: Performed by: PHYSICIAN ASSISTANT

## 2021-09-14 PROCEDURE — 93010 ELECTROCARDIOGRAM REPORT: CPT | Mod: ,,, | Performed by: INTERNAL MEDICINE

## 2021-09-14 RX ORDER — FLUTICASONE PROPIONATE 50 MCG
2 SPRAY, SUSPENSION (ML) NASAL DAILY PRN
Qty: 15 G | Refills: 0 | Status: SHIPPED | OUTPATIENT
Start: 2021-09-14 | End: 2023-04-09

## 2021-09-14 RX ORDER — AMLODIPINE BESYLATE 5 MG/1
5 TABLET ORAL
Status: COMPLETED | OUTPATIENT
Start: 2021-09-14 | End: 2021-09-14

## 2021-09-14 RX ORDER — AMLODIPINE AND OLMESARTAN MEDOXOMIL 5; 20 MG/1; MG/1
1 TABLET ORAL DAILY
Qty: 30 TABLET | Refills: 1 | Status: ON HOLD | OUTPATIENT
Start: 2021-09-14 | End: 2022-02-16

## 2021-09-14 RX ORDER — BENZONATATE 200 MG/1
200 CAPSULE ORAL 3 TIMES DAILY PRN
Qty: 30 CAPSULE | Refills: 0 | Status: SHIPPED | OUTPATIENT
Start: 2021-09-14 | End: 2021-09-24

## 2021-09-14 RX ORDER — ALBUTEROL SULFATE 90 UG/1
2 AEROSOL, METERED RESPIRATORY (INHALATION) EVERY 4 HOURS PRN
Qty: 6.7 G | Refills: 0 | Status: SHIPPED | OUTPATIENT
Start: 2021-09-14 | End: 2024-01-14

## 2021-09-14 RX ORDER — DEXTROMETHORPHAN HYDROBROMIDE, GUAIFENESIN 5; 100 MG/5ML; MG/5ML
650 LIQUID ORAL 3 TIMES DAILY PRN
Qty: 20 TABLET | Refills: 0 | Status: SHIPPED | OUTPATIENT
Start: 2021-09-14 | End: 2023-04-09

## 2021-09-14 RX ORDER — CLONIDINE HYDROCHLORIDE 0.1 MG/1
0.1 TABLET ORAL
Status: COMPLETED | OUTPATIENT
Start: 2021-09-14 | End: 2021-09-14

## 2021-09-14 RX ADMIN — AMLODIPINE BESYLATE 5 MG: 5 TABLET ORAL at 08:09

## 2021-09-14 RX ADMIN — CLONIDINE HYDROCHLORIDE 0.1 MG: 0.1 TABLET ORAL at 08:09

## 2022-02-14 PROCEDURE — 99285 EMERGENCY DEPT VISIT HI MDM: CPT | Mod: 25

## 2022-02-15 ENCOUNTER — HOSPITAL ENCOUNTER (OUTPATIENT)
Facility: HOSPITAL | Age: 47
Discharge: HOME | End: 2022-02-16
Attending: EMERGENCY MEDICINE | Admitting: EMERGENCY MEDICINE
Payer: MEDICAID

## 2022-02-15 DIAGNOSIS — G45.9 TIA (TRANSIENT ISCHEMIC ATTACK): ICD-10-CM

## 2022-02-15 DIAGNOSIS — I63.9 STROKE: ICD-10-CM

## 2022-02-15 DIAGNOSIS — R51.9 ACUTE NONINTRACTABLE HEADACHE, UNSPECIFIED HEADACHE TYPE: Primary | ICD-10-CM

## 2022-02-15 DIAGNOSIS — R53.1 LEFT-SIDED WEAKNESS: ICD-10-CM

## 2022-02-15 PROBLEM — E03.9 HYPOTHYROIDISM: Status: ACTIVE | Noted: 2022-02-15

## 2022-02-15 PROBLEM — D64.9 ANEMIA: Status: ACTIVE | Noted: 2022-02-15

## 2022-02-15 LAB
ALBUMIN SERPL BCP-MCNC: 3.8 G/DL (ref 3.5–5.2)
ALP SERPL-CCNC: 79 U/L (ref 55–135)
ALT SERPL W/O P-5'-P-CCNC: 13 U/L (ref 10–44)
AMPHET+METHAMPHET UR QL: NEGATIVE
ANION GAP SERPL CALC-SCNC: 8 MMOL/L (ref 8–16)
ASCENDING AORTA: 3.26 CM
AST SERPL-CCNC: 15 U/L (ref 10–40)
AV INDEX (PROSTH): 0.6
AV MEAN GRADIENT: 6 MMHG
AV PEAK GRADIENT: 11 MMHG
AV VALVE AREA: 1.86 CM2
AV VELOCITY RATIO: 0.58
B-HCG UR QL: NEGATIVE
BARBITURATES UR QL SCN>200 NG/ML: ABNORMAL
BASOPHILS # BLD AUTO: 0.08 K/UL (ref 0–0.2)
BASOPHILS NFR BLD: 0.8 % (ref 0–1.9)
BENZODIAZ UR QL SCN>200 NG/ML: NEGATIVE
BILIRUB SERPL-MCNC: 1 MG/DL (ref 0.1–1)
BILIRUB UR QL STRIP: NEGATIVE
BSA FOR ECHO PROCEDURE: 1.81 M2
BUN SERPL-MCNC: 13 MG/DL (ref 6–20)
BZE UR QL SCN: NEGATIVE
CALCIUM SERPL-MCNC: 9.5 MG/DL (ref 8.7–10.5)
CANNABINOIDS UR QL SCN: ABNORMAL
CHLORIDE SERPL-SCNC: 108 MMOL/L (ref 95–110)
CHOLEST SERPL-MCNC: 186 MG/DL (ref 120–199)
CHOLEST/HDLC SERPL: 4.3 {RATIO} (ref 2–5)
CLARITY UR: CLEAR
CO2 SERPL-SCNC: 23 MMOL/L (ref 23–29)
COLOR UR: YELLOW
CREAT SERPL-MCNC: 0.9 MG/DL (ref 0.5–1.4)
CREAT UR-MCNC: 288.1 MG/DL (ref 15–325)
CTP QC/QA: YES
CTP QC/QA: YES
CV ECHO LV RWT: 0.46 CM
DIFFERENTIAL METHOD: ABNORMAL
DOP CALC AO PEAK VEL: 1.68 M/S
DOP CALC AO VTI: 31.51 CM
DOP CALC LVOT AREA: 3.1 CM2
DOP CALC LVOT DIAMETER: 1.99 CM
DOP CALC LVOT PEAK VEL: 0.97 M/S
DOP CALC LVOT STROKE VOLUME: 58.66 CM3
DOP CALCLVOT PEAK VEL VTI: 18.87 CM
E WAVE DECELERATION TIME: 202.17 MSEC
E/A RATIO: 0.81
E/E' RATIO: 14 M/S
ECHO LV POSTERIOR WALL: 0.99 CM (ref 0.6–1.1)
EJECTION FRACTION: 65 %
EOSINOPHIL # BLD AUTO: 0.3 K/UL (ref 0–0.5)
EOSINOPHIL NFR BLD: 2.6 % (ref 0–8)
ERYTHROCYTE [DISTWIDTH] IN BLOOD BY AUTOMATED COUNT: 16.4 % (ref 11.5–14.5)
EST. GFR  (AFRICAN AMERICAN): >60 ML/MIN/1.73 M^2
EST. GFR  (NON AFRICAN AMERICAN): >60 ML/MIN/1.73 M^2
ESTIMATED AVG GLUCOSE: 94 MG/DL (ref 68–131)
FRACTIONAL SHORTENING: 33 % (ref 28–44)
GLUCOSE SERPL-MCNC: 92 MG/DL (ref 70–110)
GLUCOSE SERPL-MCNC: 93 MG/DL (ref 70–110)
GLUCOSE UR QL STRIP: NEGATIVE
HBA1C MFR BLD: 4.9 % (ref 4–5.6)
HCT VFR BLD AUTO: 34.2 % (ref 37–48.5)
HDLC SERPL-MCNC: 43 MG/DL (ref 40–75)
HDLC SERPL: 23.1 % (ref 20–50)
HGB BLD-MCNC: 10.1 G/DL (ref 12–16)
HGB UR QL STRIP: NEGATIVE
IMM GRANULOCYTES # BLD AUTO: 0.03 K/UL (ref 0–0.04)
IMM GRANULOCYTES NFR BLD AUTO: 0.3 % (ref 0–0.5)
INR PPP: 1 (ref 0.8–1.2)
INTERVENTRICULAR SEPTUM: 0.96 CM (ref 0.6–1.1)
IVRT: 108.47 MSEC
KETONES UR QL STRIP: NEGATIVE
LA MAJOR: 4.82 CM
LA MINOR: 4.49 CM
LA WIDTH: 4.14 CM
LDLC SERPL CALC-MCNC: 126.6 MG/DL (ref 63–159)
LEFT ATRIUM SIZE: 4.09 CM
LEFT ATRIUM VOLUME INDEX: 38.2 ML/M2
LEFT ATRIUM VOLUME: 66.91 CM3
LEFT INTERNAL DIMENSION IN SYSTOLE: 2.87 CM (ref 2.1–4)
LEFT VENTRICLE DIASTOLIC VOLUME INDEX: 46.31 ML/M2
LEFT VENTRICLE DIASTOLIC VOLUME: 81.05 ML
LEFT VENTRICLE MASS INDEX: 77 G/M2
LEFT VENTRICLE SYSTOLIC VOLUME INDEX: 18 ML/M2
LEFT VENTRICLE SYSTOLIC VOLUME: 31.43 ML
LEFT VENTRICULAR INTERNAL DIMENSION IN DIASTOLE: 4.26 CM (ref 3.5–6)
LEFT VENTRICULAR MASS: 135.53 G
LEUKOCYTE ESTERASE UR QL STRIP: NEGATIVE
LV LATERAL E/E' RATIO: 15.17 M/S
LV SEPTAL E/E' RATIO: 13 M/S
LYMPHOCYTES # BLD AUTO: 2.7 K/UL (ref 1–4.8)
LYMPHOCYTES NFR BLD: 28.8 % (ref 18–48)
MAGNESIUM SERPL-MCNC: 2.1 MG/DL (ref 1.6–2.6)
MCH RBC QN AUTO: 23.1 PG (ref 27–31)
MCHC RBC AUTO-ENTMCNC: 29.5 G/DL (ref 32–36)
MCV RBC AUTO: 78 FL (ref 82–98)
METHADONE UR QL SCN>300 NG/ML: NEGATIVE
MONOCYTES # BLD AUTO: 1.1 K/UL (ref 0.3–1)
MONOCYTES NFR BLD: 11 % (ref 4–15)
MV PEAK A VEL: 1.12 M/S
MV PEAK E VEL: 0.91 M/S
MV STENOSIS PRESSURE HALF TIME: 58.63 MS
MV VALVE AREA P 1/2 METHOD: 3.75 CM2
NEUTROPHILS # BLD AUTO: 5.4 K/UL (ref 1.8–7.7)
NEUTROPHILS NFR BLD: 56.5 % (ref 38–73)
NITRITE UR QL STRIP: NEGATIVE
NONHDLC SERPL-MCNC: 143 MG/DL
NRBC BLD-RTO: 0 /100 WBC
OPIATES UR QL SCN: NEGATIVE
PCP UR QL SCN>25 NG/ML: NEGATIVE
PH UR STRIP: 6 [PH] (ref 5–8)
PISA TR MAX VEL: 2.27 M/S
PLATELET # BLD AUTO: 342 K/UL (ref 150–450)
PMV BLD AUTO: 10.2 FL (ref 9.2–12.9)
POCT GLUCOSE: 92 MG/DL (ref 70–110)
POTASSIUM SERPL-SCNC: 3.5 MMOL/L (ref 3.5–5.1)
PROT SERPL-MCNC: 8.3 G/DL (ref 6–8.4)
PROT UR QL STRIP: ABNORMAL
PROTHROMBIN TIME: 10.7 SEC (ref 9–12.5)
PULM VEIN S/D RATIO: 1.43
PV PEAK D VEL: 0.37 M/S
PV PEAK S VEL: 0.53 M/S
PV PEAK VELOCITY: 0.93 CM/S
RA MAJOR: 3.82 CM
RA PRESSURE: 3 MMHG
RA WIDTH: 3 CM
RBC # BLD AUTO: 4.38 M/UL (ref 4–5.4)
RIGHT VENTRICULAR END-DIASTOLIC DIMENSION: 2.72 CM
RV TISSUE DOPPLER FREE WALL SYSTOLIC VELOCITY 1 (APICAL 4 CHAMBER VIEW): 12.77 CM/S
SARS-COV-2 RDRP RESP QL NAA+PROBE: NEGATIVE
SINUS: 2.8 CM
SODIUM SERPL-SCNC: 139 MMOL/L (ref 136–145)
SP GR UR STRIP: 1.03 (ref 1–1.03)
STJ: 2.33 CM
T4 FREE SERPL-MCNC: 0.92 NG/DL (ref 0.71–1.51)
TDI LATERAL: 0.06 M/S
TDI SEPTAL: 0.07 M/S
TDI: 0.07 M/S
TOXICOLOGY INFORMATION: ABNORMAL
TR MAX PG: 21 MMHG
TRICUSPID ANNULAR PLANE SYSTOLIC EXCURSION: 2.32 CM
TRIGL SERPL-MCNC: 82 MG/DL (ref 30–150)
TROPONIN I SERPL DL<=0.01 NG/ML-MCNC: <0.006 NG/ML (ref 0–0.03)
TSH SERPL DL<=0.005 MIU/L-ACNC: 9.18 UIU/ML (ref 0.4–4)
TV REST PULMONARY ARTERY PRESSURE: 24 MMHG
URN SPEC COLLECT METH UR: ABNORMAL
UROBILINOGEN UR STRIP-ACNC: NEGATIVE EU/DL
WBC # BLD AUTO: 9.52 K/UL (ref 3.9–12.7)

## 2022-02-15 PROCEDURE — 25000003 PHARM REV CODE 250: Performed by: NURSE PRACTITIONER

## 2022-02-15 PROCEDURE — 96376 TX/PRO/DX INJ SAME DRUG ADON: CPT | Mod: 59

## 2022-02-15 PROCEDURE — 93005 ELECTROCARDIOGRAM TRACING: CPT

## 2022-02-15 PROCEDURE — 96372 THER/PROPH/DIAG INJ SC/IM: CPT | Mod: 59 | Performed by: NURSE PRACTITIONER

## 2022-02-15 PROCEDURE — 25000003 PHARM REV CODE 250: Performed by: STUDENT IN AN ORGANIZED HEALTH CARE EDUCATION/TRAINING PROGRAM

## 2022-02-15 PROCEDURE — 84439 ASSAY OF FREE THYROXINE: CPT | Performed by: PHYSICIAN ASSISTANT

## 2022-02-15 PROCEDURE — 80053 COMPREHEN METABOLIC PANEL: CPT | Performed by: PHYSICIAN ASSISTANT

## 2022-02-15 PROCEDURE — 82962 GLUCOSE BLOOD TEST: CPT

## 2022-02-15 PROCEDURE — 81025 URINE PREGNANCY TEST: CPT | Performed by: EMERGENCY MEDICINE

## 2022-02-15 PROCEDURE — 83735 ASSAY OF MAGNESIUM: CPT | Performed by: PHYSICIAN ASSISTANT

## 2022-02-15 PROCEDURE — G0378 HOSPITAL OBSERVATION PER HR: HCPCS

## 2022-02-15 PROCEDURE — 80061 LIPID PANEL: CPT | Performed by: PHYSICIAN ASSISTANT

## 2022-02-15 PROCEDURE — 99284 EMERGENCY DEPT VISIT MOD MDM: CPT | Mod: ,,, | Performed by: PSYCHIATRY & NEUROLOGY

## 2022-02-15 PROCEDURE — 85025 COMPLETE CBC W/AUTO DIFF WBC: CPT | Performed by: PHYSICIAN ASSISTANT

## 2022-02-15 PROCEDURE — 83036 HEMOGLOBIN GLYCOSYLATED A1C: CPT | Performed by: NURSE PRACTITIONER

## 2022-02-15 PROCEDURE — 85610 PROTHROMBIN TIME: CPT | Performed by: PHYSICIAN ASSISTANT

## 2022-02-15 PROCEDURE — U0002 COVID-19 LAB TEST NON-CDC: HCPCS | Performed by: PHYSICIAN ASSISTANT

## 2022-02-15 PROCEDURE — 96375 TX/PRO/DX INJ NEW DRUG ADDON: CPT

## 2022-02-15 PROCEDURE — 93010 EKG 12-LEAD: ICD-10-PCS | Mod: ,,, | Performed by: INTERNAL MEDICINE

## 2022-02-15 PROCEDURE — 96374 THER/PROPH/DIAG INJ IV PUSH: CPT | Mod: 59

## 2022-02-15 PROCEDURE — 25000003 PHARM REV CODE 250: Performed by: PHYSICIAN ASSISTANT

## 2022-02-15 PROCEDURE — 80307 DRUG TEST PRSMV CHEM ANLYZR: CPT | Performed by: PHYSICIAN ASSISTANT

## 2022-02-15 PROCEDURE — 84484 ASSAY OF TROPONIN QUANT: CPT | Performed by: NURSE PRACTITIONER

## 2022-02-15 PROCEDURE — 92610 EVALUATE SWALLOWING FUNCTION: CPT

## 2022-02-15 PROCEDURE — 63600175 PHARM REV CODE 636 W HCPCS: Performed by: STUDENT IN AN ORGANIZED HEALTH CARE EDUCATION/TRAINING PROGRAM

## 2022-02-15 PROCEDURE — 99284 PR EMERGENCY DEPT VISIT,LEVEL IV: ICD-10-PCS | Mod: ,,, | Performed by: PSYCHIATRY & NEUROLOGY

## 2022-02-15 PROCEDURE — 84443 ASSAY THYROID STIM HORMONE: CPT | Performed by: PHYSICIAN ASSISTANT

## 2022-02-15 PROCEDURE — 63600175 PHARM REV CODE 636 W HCPCS: Performed by: NURSE PRACTITIONER

## 2022-02-15 PROCEDURE — 93010 ELECTROCARDIOGRAM REPORT: CPT | Mod: ,,, | Performed by: INTERNAL MEDICINE

## 2022-02-15 PROCEDURE — 81003 URINALYSIS AUTO W/O SCOPE: CPT | Mod: 59 | Performed by: NURSE PRACTITIONER

## 2022-02-15 RX ORDER — DOCUSATE SODIUM 100 MG/1
100 CAPSULE, LIQUID FILLED ORAL DAILY
Status: DISCONTINUED | OUTPATIENT
Start: 2022-02-15 | End: 2022-02-16 | Stop reason: HOSPADM

## 2022-02-15 RX ORDER — HYDRALAZINE HYDROCHLORIDE 20 MG/ML
5 INJECTION INTRAMUSCULAR; INTRAVENOUS EVERY 6 HOURS PRN
Status: DISCONTINUED | OUTPATIENT
Start: 2022-02-15 | End: 2022-02-15

## 2022-02-15 RX ORDER — LEVOTHYROXINE SODIUM 25 UG/1
25 TABLET ORAL
Status: DISCONTINUED | OUTPATIENT
Start: 2022-02-15 | End: 2022-02-15

## 2022-02-15 RX ORDER — CARVEDILOL 12.5 MG/1
12.5 TABLET ORAL 2 TIMES DAILY
Status: DISCONTINUED | OUTPATIENT
Start: 2022-02-15 | End: 2022-02-16

## 2022-02-15 RX ORDER — ASPIRIN 81 MG/1
81 TABLET ORAL DAILY
Status: DISCONTINUED | OUTPATIENT
Start: 2022-02-15 | End: 2022-02-16 | Stop reason: HOSPADM

## 2022-02-15 RX ORDER — AMLODIPINE BESYLATE 5 MG/1
10 TABLET ORAL DAILY
Status: DISCONTINUED | OUTPATIENT
Start: 2022-02-16 | End: 2022-02-16

## 2022-02-15 RX ORDER — ONDANSETRON 4 MG/1
4 TABLET, ORALLY DISINTEGRATING ORAL EVERY 6 HOURS PRN
Status: DISCONTINUED | OUTPATIENT
Start: 2022-02-15 | End: 2022-02-16 | Stop reason: HOSPADM

## 2022-02-15 RX ORDER — LOSARTAN POTASSIUM 25 MG/1
100 TABLET ORAL DAILY
Status: DISCONTINUED | OUTPATIENT
Start: 2022-02-16 | End: 2022-02-16

## 2022-02-15 RX ORDER — BUTALBITAL, ACETAMINOPHEN AND CAFFEINE 50; 325; 40 MG/1; MG/1; MG/1
1 TABLET ORAL
Status: COMPLETED | OUTPATIENT
Start: 2022-02-15 | End: 2022-02-15

## 2022-02-15 RX ORDER — SODIUM CHLORIDE 0.9 % (FLUSH) 0.9 %
10 SYRINGE (ML) INJECTION
Status: DISCONTINUED | OUTPATIENT
Start: 2022-02-15 | End: 2022-02-16 | Stop reason: HOSPADM

## 2022-02-15 RX ORDER — AMLODIPINE BESYLATE 5 MG/1
5 TABLET ORAL DAILY
Status: DISCONTINUED | OUTPATIENT
Start: 2022-02-15 | End: 2022-02-15

## 2022-02-15 RX ORDER — LABETALOL HYDROCHLORIDE 5 MG/ML
10 INJECTION, SOLUTION INTRAVENOUS EVERY 4 HOURS PRN
Status: DISCONTINUED | OUTPATIENT
Start: 2022-02-15 | End: 2022-02-16 | Stop reason: HOSPADM

## 2022-02-15 RX ORDER — LOSARTAN POTASSIUM 25 MG/1
50 TABLET ORAL ONCE
Status: COMPLETED | OUTPATIENT
Start: 2022-02-15 | End: 2022-02-15

## 2022-02-15 RX ORDER — LANOLIN ALCOHOL/MO/W.PET/CERES
1 CREAM (GRAM) TOPICAL DAILY
Status: DISCONTINUED | OUTPATIENT
Start: 2022-02-15 | End: 2022-02-16 | Stop reason: HOSPADM

## 2022-02-15 RX ORDER — ENOXAPARIN SODIUM 100 MG/ML
40 INJECTION SUBCUTANEOUS EVERY 24 HOURS
Status: DISCONTINUED | OUTPATIENT
Start: 2022-02-15 | End: 2022-02-16 | Stop reason: HOSPADM

## 2022-02-15 RX ORDER — BUTALBITAL, ACETAMINOPHEN AND CAFFEINE 50; 325; 40 MG/1; MG/1; MG/1
1 TABLET ORAL EVERY 4 HOURS PRN
Status: DISCONTINUED | OUTPATIENT
Start: 2022-02-15 | End: 2022-02-16 | Stop reason: HOSPADM

## 2022-02-15 RX ORDER — HYDRALAZINE HYDROCHLORIDE 20 MG/ML
10 INJECTION INTRAMUSCULAR; INTRAVENOUS EVERY 6 HOURS PRN
Status: DISCONTINUED | OUTPATIENT
Start: 2022-02-15 | End: 2022-02-16 | Stop reason: HOSPADM

## 2022-02-15 RX ADMIN — LEVOTHYROXINE SODIUM 25 MCG: 0.03 TABLET ORAL at 08:02

## 2022-02-15 RX ADMIN — HYDRALAZINE HYDROCHLORIDE 10 MG: 20 INJECTION, SOLUTION INTRAMUSCULAR; INTRAVENOUS at 03:02

## 2022-02-15 RX ADMIN — FERROUS SULFATE TAB 325 MG (65 MG ELEMENTAL FE) 1 EACH: 325 (65 FE) TAB at 09:02

## 2022-02-15 RX ADMIN — BUTALBITAL, ACETAMINOPHEN AND CAFFEINE 1 TABLET: 50; 325; 40 TABLET ORAL at 06:02

## 2022-02-15 RX ADMIN — ONDANSETRON 4 MG: 4 TABLET, ORALLY DISINTEGRATING ORAL at 12:02

## 2022-02-15 RX ADMIN — AMLODIPINE BESYLATE 5 MG: 5 TABLET ORAL at 08:02

## 2022-02-15 RX ADMIN — CARVEDILOL 12.5 MG: 12.5 TABLET, FILM COATED ORAL at 11:02

## 2022-02-15 RX ADMIN — LABETALOL HYDROCHLORIDE 10 MG: 5 INJECTION INTRAVENOUS at 11:02

## 2022-02-15 RX ADMIN — BUTALBITAL, ACETAMINOPHEN AND CAFFEINE 1 TABLET: 50; 325; 40 TABLET ORAL at 03:02

## 2022-02-15 RX ADMIN — HYDRALAZINE HYDROCHLORIDE 5 MG: 20 INJECTION, SOLUTION INTRAMUSCULAR; INTRAVENOUS at 08:02

## 2022-02-15 RX ADMIN — AMLODIPINE BESYLATE 5 MG: 5 TABLET ORAL at 04:02

## 2022-02-15 RX ADMIN — LOSARTAN POTASSIUM 12.5 MG: 25 TABLET, FILM COATED ORAL at 04:02

## 2022-02-15 RX ADMIN — DOCUSATE SODIUM 100 MG: 100 CAPSULE ORAL at 08:02

## 2022-02-15 RX ADMIN — ENOXAPARIN SODIUM 40 MG: 100 INJECTION SUBCUTANEOUS at 04:02

## 2022-02-15 RX ADMIN — CARVEDILOL 12.5 MG: 12.5 TABLET, FILM COATED ORAL at 08:02

## 2022-02-15 RX ADMIN — ONDANSETRON 4 MG: 4 TABLET, ORALLY DISINTEGRATING ORAL at 08:02

## 2022-02-15 RX ADMIN — ASPIRIN 81 MG: 81 TABLET, DELAYED RELEASE ORAL at 08:02

## 2022-02-15 RX ADMIN — LOSARTAN POTASSIUM 50 MG: 25 TABLET, FILM COATED ORAL at 11:02

## 2022-02-15 NOTE — SUBJECTIVE & OBJECTIVE
Past Medical History:   Diagnosis Date    Anemia     Encounter for blood transfusion     History of seizure disorder     HTN (hypertension)     Seizures     last one when she was 20 years old       Past Surgical History:   Procedure Laterality Date    HERNIA REPAIR      TUBAL LIGATION         Review of patient's allergies indicates:  No Known Allergies    No current facility-administered medications on file prior to encounter.     Current Outpatient Medications on File Prior to Encounter   Medication Sig    acetaminophen (TYLENOL) 650 MG TbSR Take 1 tablet (650 mg total) by mouth 3 (three) times daily as needed (chest discomfort, congestion).    albuterol (PROVENTIL/VENTOLIN HFA) 90 mcg/actuation inhaler Inhale 2 puffs into the lungs every 4 (four) hours as needed for Shortness of Breath. Rescue    amlodipine-olmesartan (ARMAAN) 5-20 mg per tablet Take 1 tablet by mouth once daily.    fluticasone propionate (FLONASE) 50 mcg/actuation nasal spray 2 sprays (100 mcg total) by Each Nostril route daily as needed (nasal congestion).    ibuprofen (ADVIL,MOTRIN) 600 MG tablet Take 1 tablet (600 mg total) by mouth every 6 (six) hours as needed for Pain.    lidocaine (LIDODERM) 5 % Place 1 patch onto the skin once daily. Remove & Discard patch within 12 hours or as directed by MD. May use 4% formulation if more affordable for patient.    meloxicam (MOBIC) 7.5 MG tablet Take 1 tablet (7.5 mg total) by mouth once daily.    meloxicam (MOBIC) 7.5 MG tablet Take 1 tablet (7.5 mg total) by mouth once daily.    naproxen (NAPROSYN) 500 MG tablet Take 500 mg by mouth 2 (two) times daily.     Family History     Problem Relation (Age of Onset)    Asthma Mother, Sister        Tobacco Use    Smoking status: Never Smoker    Smokeless tobacco: Never Used   Substance and Sexual Activity    Alcohol use: Yes     Comment: occasional    Drug use: Yes     Types: Marijuana    Sexual activity: Yes     Partners: Male     Review of  Systems   Constitutional: Negative for fever.   Eyes: Negative for photophobia and visual disturbance.   Respiratory: Negative for shortness of breath.    Cardiovascular: Negative for chest pain and palpitations.   Gastrointestinal: Negative for nausea and vomiting.   Musculoskeletal: Negative for neck pain and neck stiffness.   Neurological: Positive for dizziness, weakness, light-headedness and headaches. Negative for facial asymmetry.   Objective:     Vital Signs (Most Recent):  Temp: 98 °F (36.7 °C) (02/15/22 0008)  Pulse: 73 (02/15/22 0409)  Resp: 15 (02/15/22 0409)  BP: (!) 175/103 (02/15/22 0409)  SpO2: 100 % (02/15/22 0409) Vital Signs (24h Range):  Temp:  [98 °F (36.7 °C)] 98 °F (36.7 °C)  Pulse:  [59-74] 73  Resp:  [15-20] 15  SpO2:  [98 %-100 %] 100 %  BP: (169-188)/(103-113) 175/103     Weight: 75.8 kg (167 lb)  Body mass index is 31.55 kg/m².    Physical Exam  Nursing note and vitals reviewed.  Constitutional: She appears well-developed and well-nourished. She is not diaphoretic. No distress.   Uncomfortable-appearing, nontoxic.    HENT:   Head: Normocephalic and atraumatic.   Eyes: EOM are normal.   Neck: Neck supple.   Normal range of motion.  Cardiovascular: Intact distal pulses.   Pulmonary/Chest: No respiratory distress.   Musculoskeletal:      Cervical back: Normal range of motion and neck supple.     Neurological: She is alert and oriented to person, place, and time. GCS score is 15. GCS eye subscore is 4. GCS verbal subscore is 5. GCS motor subscore is 6.   LUE strength 3/5  RUE strength 4/5    LLE strength 2/5  RLE strength 5/5    Able to heel and toe walk. Negative romberg, no pronator drift. Normal, steady gait.    Skin: Skin is warm. Capillary refill takes less than 2 seconds.   Psychiatric: She has a normal mood and affect. Thought content normal    Significant Labs: All pertinent labs within the past 24 hours have been reviewed.    Significant Imaging: I have reviewed all pertinent imaging  results/findings within the past 24 hours.

## 2022-02-15 NOTE — PT/OT/SLP PROGRESS
Physical Therapy      Patient Name:  Carol Yarbrough   MRN:  [id number]    Patient not seen today secondary to Patient ill (Comment). Increased BP all day.  Will follow-up 2/16/22.

## 2022-02-15 NOTE — H&P
[facility] Medicine  History & Physical    Patient Name: Carol Yarbrough  MRN: [id number]  Patient Class: OP- Observation  Admission Date: 2/15/2022  Attending Physician: Stefani Campos MD    Primary Care Provider: To Obtain Unable         Patient information was obtained from patient and ER records.     Subjective:     Principal Problem:Acute nonintractable headache    Chief Complaint:   Chief Complaint   Patient presents with    Headache     Headache for 2-3 hours, no dizziness or blurred vision. Hx of HTN and has misplaced her meds. Last dose about 2 weeks ago.        HPI: 46yo F with pmh HTN, remote hx seizure disorder, anemia, hx blood transfusion, with chief complaint 2-3d hx L sided frontal HA. HA intermittent, L frontal scalp/forehead, behind L eye. Pain sharp. Mild relief with ibuprofen. HA intermittent, nonradiating. No trauma. No visual disturbance. No fever. No hx glaucoma. Pt states HA similar to previous migraine-type headaches. Pt states has been feeling off-balance with walking, randomly, infrequently x 1mo. She admits to L sided weakness x approx 2w. Noncompliant with antihypertensives x 1-2w. No CP, no SOB, no fever.  Similar symptoms in 2019, negative CT head and MRI. No neurology f/u. No hx autoimmune dz.       Past Medical History:   Diagnosis Date    Anemia     Encounter for blood transfusion     History of seizure disorder     HTN (hypertension)     Seizures     last one when she was 20 years old       Past Surgical History:   Procedure Laterality Date    HERNIA REPAIR      TUBAL LIGATION         Review of patient's allergies indicates:  No Known Allergies    No current facility-administered medications on file prior to encounter.     Current Outpatient Medications on File Prior to Encounter   Medication Sig    acetaminophen (TYLENOL) 650 MG TbSR Take 1 tablet (650 mg total) by mouth 3 (three) times daily as needed (chest discomfort, congestion).     albuterol (PROVENTIL/VENTOLIN HFA) 90 mcg/actuation inhaler Inhale 2 puffs into the lungs every 4 (four) hours as needed for Shortness of Breath. Rescue    amlodipine-olmesartan (ARMAAN) 5-20 mg per tablet Take 1 tablet by mouth once daily.    fluticasone propionate (FLONASE) 50 mcg/actuation nasal spray 2 sprays (100 mcg total) by Each Nostril route daily as needed (nasal congestion).    ibuprofen (ADVIL,MOTRIN) 600 MG tablet Take 1 tablet (600 mg total) by mouth every 6 (six) hours as needed for Pain.    lidocaine (LIDODERM) 5 % Place 1 patch onto the skin once daily. Remove & Discard patch within 12 hours or as directed by MD. May use 4% formulation if more affordable for patient.    meloxicam (MOBIC) 7.5 MG tablet Take 1 tablet (7.5 mg total) by mouth once daily.    meloxicam (MOBIC) 7.5 MG tablet Take 1 tablet (7.5 mg total) by mouth once daily.    naproxen (NAPROSYN) 500 MG tablet Take 500 mg by mouth 2 (two) times daily.     Family History     Problem Relation (Age of Onset)    Asthma Mother, Sister        Tobacco Use    Smoking status: Never Smoker    Smokeless tobacco: Never Used   Substance and Sexual Activity    Alcohol use: Yes     Comment: occasional    Drug use: Yes     Types: Marijuana    Sexual activity: Yes     Partners: Male     Review of Systems   Constitutional: Negative for fever.   Eyes: Negative for photophobia and visual disturbance.   Respiratory: Negative for shortness of breath.    Cardiovascular: Negative for chest pain and palpitations.   Gastrointestinal: Negative for nausea and vomiting.   Musculoskeletal: Negative for neck pain and neck stiffness.   Neurological: Positive for dizziness, weakness, light-headedness and headaches. Negative for facial asymmetry.   Objective:     Vital Signs (Most Recent):  Temp: 98 °F (36.7 °C) (02/15/22 0008)  Pulse: 73 (02/15/22 0409)  Resp: 15 (02/15/22 0409)  BP: (!) 175/103 (02/15/22 0409)  SpO2: 100 % (02/15/22 0409) Vital Signs (24h  Range):  Temp:  [98 °F (36.7 °C)] 98 °F (36.7 °C)  Pulse:  [59-74] 73  Resp:  [15-20] 15  SpO2:  [98 %-100 %] 100 %  BP: (169-188)/(103-113) 175/103     Weight: 75.8 kg (167 lb)  Body mass index is 31.55 kg/m².    Physical Exam  Nursing note and vitals reviewed.  Constitutional: She appears well-developed and well-nourished. She is not diaphoretic. No distress.   Uncomfortable-appearing, nontoxic.    HENT:   Head: Normocephalic and atraumatic.   Eyes: EOM are normal.   Neck: Neck supple.   Normal range of motion.  Cardiovascular: Intact distal pulses.   Pulmonary/Chest: No respiratory distress.   Musculoskeletal:      Cervical back: Normal range of motion and neck supple.     Neurological: She is alert and oriented to person, place, and time. GCS score is 15. GCS eye subscore is 4. GCS verbal subscore is 5. GCS motor subscore is 6.   LUE strength 3/5  RUE strength 4/5    LLE strength 2/5  RLE strength 5/5    Able to heel and toe walk. Negative romberg, no pronator drift. Normal, steady gait.    Skin: Skin is warm. Capillary refill takes less than 2 seconds.   Psychiatric: She has a normal mood and affect. Thought content normal    Significant Labs: All pertinent labs within the past 24 hours have been reviewed.    Significant Imaging: I have reviewed all pertinent imaging results/findings within the past 24 hours.    Assessment/Plan:     * Acute nonintractable headache  Consult neuro  Consult PT/OT/ST   MRI Brain, US carotid bilateral, echo  Neuro checks q4  Started on ASA   continuous tele monitoring   CT head unremarkable     Hypothyroidism  TSH 9.181  Started on synthroid 25 mcg  F/u outpt with endocrinologist       Anemia  H/H stabel 10.1/34.2  Start iron supplement daily      Weakness of left lower extremity  Consult neuro  Consult PT/OT/ST   MRI Brain, US carotid bilateral, echo  Neuro checks q4  Started on ASA   continuous tele monitoring   CT Head unremarkable     Essential hypertension, benign  Resume home  med  Hydralazine prn  Education on taking BP meds as prescribed      VTE Risk Mitigation (From admission, onward)         Ordered     enoxaparin injection 40 mg  Daily         02/15/22 0500     IP VTE HIGH RISK PATIENT  Once         02/15/22 0500     Place sequential compression device  Until discontinued         02/15/22 0500            As clarification, on 2/15/22 @0450, patient should be placed in hospital observation services under my care in collaboration with  MD Brigido Blount NP  Department of Hospital Medicine   [facility] - Emergency Dept

## 2022-02-15 NOTE — ASSESSMENT & PLAN NOTE
Consult neuro  Consult PT/OT/ST   MRI Brain, US carotid bilateral, echo  Neuro checks q4  Started on ASA   continuous tele monitoring   CT head unremarkable

## 2022-02-15 NOTE — CARE UPDATE
Patient seen and examined by myself. H&P reviewed. Updates below.    Ms. Yarbrough is a 46 yo F with hx of HTN, anemia who was placed under observation for severe hypertension and acute headache for the past few days. She states her living situation has changed and she misplaced her meds - she is only on amlodipine-olmesartan for her BP. Her BP has been very high with systolic in 200s. She feels like when her BP is elevated she has a pounding behind her eye. MRI did not show any acute findings. Neurology consulted. She has been nauseous and unable to tolerate PO on my exam. Will do trial of zofran to see if she can tolerate meals and oral medications to safely bring down blood pressure.    Davis Sarmiento MD  Department of Hospital Medicine  [facility]

## 2022-02-15 NOTE — CONSULTS
[facility]  Neurology  Consult Note    Patient Name: Carol Yarbrough  MRN: [id number]  Admission Date: 2/15/2022  Hospital Length of Stay: 0 days  Code Status: Full Code   Attending Provider: Davis Sarmiento MD   Consulting Provider: Trung Antony MD  Primary Care Physician: To Obtain Unable  Principal Problem:Acute nonintractable headache    Inpatient consult to Neurology  Consult performed by: Trung Antony MD  Consult ordered by: Brigido Son NP        Subjective:     Chief Complaint: Headache    HPI:46 y/o female with PMHx of HTN comes to ED due complaint left sided frontal headache. This headache has been intermittent. Partial relief taking ibuprofen. Upon arrival to ED her BP was 160-200/'s. Pt reports no visual or speech disturbance, vertigo, numbness of limbs. She does reports weakness on left side for two weeks.    Past Medical History:   Diagnosis Date    Anemia     Encounter for blood transfusion     History of seizure disorder     HTN (hypertension)     Seizures     last one when she was 20 years old       Past Surgical History:   Procedure Laterality Date    HERNIA REPAIR      TUBAL LIGATION         Review of patient's allergies indicates:  No Known Allergies    Current Neurological Medications:     No current facility-administered medications on file prior to encounter.     Current Outpatient Medications on File Prior to Encounter   Medication Sig    acetaminophen (TYLENOL) 650 MG TbSR Take 1 tablet (650 mg total) by mouth 3 (three) times daily as needed (chest discomfort, congestion).    albuterol (PROVENTIL/VENTOLIN HFA) 90 mcg/actuation inhaler Inhale 2 puffs into the lungs every 4 (four) hours as needed for Shortness of Breath. Rescue    amlodipine-olmesartan (ARMAAN) 5-20 mg per tablet Take 1 tablet by mouth once daily.    fluticasone propionate (FLONASE) 50 mcg/actuation nasal spray 2 sprays (100 mcg total) by Each Nostril route daily as needed (nasal  congestion).    ibuprofen (ADVIL,MOTRIN) 600 MG tablet Take 1 tablet (600 mg total) by mouth every 6 (six) hours as needed for Pain.    lidocaine (LIDODERM) 5 % Place 1 patch onto the skin once daily. Remove & Discard patch within 12 hours or as directed by MD. May use 4% formulation if more affordable for patient.    meloxicam (MOBIC) 7.5 MG tablet Take 1 tablet (7.5 mg total) by mouth once daily.    meloxicam (MOBIC) 7.5 MG tablet Take 1 tablet (7.5 mg total) by mouth once daily.    naproxen (NAPROSYN) 500 MG tablet Take 500 mg by mouth 2 (two) times daily.      Family History     Problem Relation (Age of Onset)    Asthma Mother, Sister        Tobacco Use    Smoking status: Never Smoker    Smokeless tobacco: Never Used   Substance and Sexual Activity    Alcohol use: Yes     Comment: occasional    Drug use: Yes     Types: Marijuana    Sexual activity: Yes     Partners: Male     Review of Systems   Constitutional: Negative for fever.   HENT: Negative for trouble swallowing.    Eyes: Negative for photophobia.   Respiratory: Negative for shortness of breath.    Cardiovascular: Negative for chest pain.   Gastrointestinal: Negative for abdominal pain.   Genitourinary: Negative for dysuria.   Musculoskeletal: Negative for back pain.   Neurological: Positive for headaches.   Psychiatric/Behavioral: Negative for confusion.     Objective:     Vital Signs (Most Recent):  Temp: 98 °F (36.7 °C) (02/15/22 0008)  Pulse: 78 (02/15/22 1148)  Resp: 16 (02/15/22 1000)  BP: (!) 202/130 (02/15/22 1148)  SpO2: 98 % (02/15/22 1000) Vital Signs (24h Range):  Temp:  [98 °F (36.7 °C)] 98 °F (36.7 °C)  Pulse:  [57-87] 78  Resp:  [13-31] 16  SpO2:  [96 %-100 %] 98 %  BP: (139-219)/() 202/130     Weight: 75.8 kg (167 lb)  Body mass index is 31.55 kg/m².    Physical Exam  Constitutional:       General: She is not in acute distress.  HENT:      Head: Normocephalic.      Right Ear: External ear normal.      Left Ear: External ear  normal.   Eyes:      General:         Right eye: No discharge.         Left eye: No discharge.   Cardiovascular:      Rate and Rhythm: Normal rate.   Pulmonary:      Breath sounds: Normal breath sounds.   Abdominal:      Palpations: Abdomen is soft.   Musculoskeletal:         General: No tenderness.      Cervical back: Neck supple.   Skin:     General: Skin is warm.   Neurological:      Mental Status: She is oriented to person, place, and time.      Deep Tendon Reflexes: Strength normal.   Psychiatric:         Speech: Speech normal.         NEUROLOGICAL EXAMINATION:     MENTAL STATUS   Oriented to person, place, and time.   Speech: speech is normal   Level of consciousness: alert    CRANIAL NERVES     CN III, IV, VI   Right pupil: Size: 2 mm. Shape: regular.   Left pupil: Size: 2 mm. Shape: regular.   Nystagmus: none   Ophthalmoparesis: none  Conjugate gaze: present    CN VII   Right facial weakness: none  Left facial weakness: none    CN XII   Tongue deviation: none    MOTOR EXAM     Strength   Strength 5/5 throughout.       Significant Labs:   CBC:   Recent Labs   Lab 02/15/22  0229   WBC 9.52   HGB 10.1*   HCT 34.2*        CMP:   Recent Labs   Lab 02/15/22  0229   GLU 93      K 3.5      CO2 23   BUN 13   CREATININE 0.9   CALCIUM 9.5   MG 2.1   PROT 8.3   ALBUMIN 3.8   BILITOT 1.0   ALKPHOS 79   AST 15   ALT 13   ANIONGAP 8   EGFRNONAA >60     LIPIDS/LFTS:  Recent Labs   Lab 02/15/22  0229   Cholesterol 186   Triglycerides 82   HDL 43   LDL Cholesterol 126.6   Non-HDL Cholesterol 143       Significant Imaging: I have reviewed all pertinent imaging results/findings within the past 24 hours.     MRI brain  FINDINGS:  There is no evidence of restricted diffusion to suggest acute infarction.     FLAIR imaging demonstrates no parenchymal signal abnormality.     The ventricles are normal in size for age, without evidence of hydrocephalus.  Incidentally noted partially empty sella configuration.     No  evidence of mass lesion, hemorrhage, edema or recent or remote major vascular distribution infarction.     No extra-axial blood or fluid collections.     T2 skull base flow voids are preserved. Bone marrow signal intensity is unremarkable.  Mild mucosal thickening throughout the paranasal sinuses, most pronounced in the ethmoid and sphenoid sinuses.     Impression:     No evidence of acute intracranial pathology.     Empty sella configuration.     Mild paranasal sinus disease.        Electronically signed by: Eder Barroso MD  Date:                                            02/15/2022  Time:                                           10:53    Assessment and Plan:     46 y/o female consulted for left sided weakness    1. TIA? episode could had been TIA but not clear.  Perhaps a combination of headache and elevated BP.   MRI brain showed no stroke.   -ASA 81 mg daily.   -Fioricet every 8 hours PRN headache    Active Diagnoses:    Diagnosis Date Noted POA    PRINCIPAL PROBLEM:  Acute nonintractable headache [R51.9] 02/15/2022 Unknown    Hypothyroidism [E03.9] 02/15/2022 Unknown    Anemia [D64.9] 02/15/2022 Unknown    Weakness of left lower extremity [R29.898] 02/12/2019 Yes    Essential hypertension, benign [I10] 03/25/2015 Yes      Problems Resolved During this Admission:       VTE Risk Mitigation (From admission, onward)         Ordered     enoxaparin injection 40 mg  Daily         02/15/22 0500     IP VTE HIGH RISK PATIENT  Once         02/15/22 0500     Place sequential compression device  Until discontinued         02/15/22 0500                Thank you for your consult. I will follow-up with patient. Please contact us if you have any additional questions.    Trung Antony MD  Neurology  [facility] - Emergency Dept

## 2022-02-15 NOTE — ED PROVIDER NOTES
Encounter Date: 2/14/2022       History     Chief Complaint   Patient presents with    Headache     Headache for 2-3 hours, no dizziness or blurred vision. Hx of HTN and has misplaced her meds. Last dose about 2 weeks ago.     46yo F with pmh HTN, remote hx seizure disorder, anemia, hx blood transfusion, with chief complaint 2-3d hx L sided frontal HA.    HA intermittent, L frontal scalp/forehead, behind L eye. Pain sharp. Mild relief with ibuprofen. HA intermittent, nonradiating. No trauma. No visual disturbance. No fever. No hx glaucoma. Pt states HA similar to previous migraine-type headaches. Pt states has been feeling off-balance with walking, randomly, infrequently x 1mo. She admits to L sided weakness x approx 2w. Noncompliant with antihypertensives x 1-2w. No CP, no SOB, no fever. No recent illness or sick contacts. No neck pain/stiffness.     No hx CVA. Similar symptoms in 2019, negative CT head and MRI. No neurology f/u. No hx autoimmune dz.     Daily rx: amlodipine-olmesartan        Review of patient's allergies indicates:  No Known Allergies  Past Medical History:   Diagnosis Date    Anemia     Encounter for blood transfusion     History of seizure disorder     HTN (hypertension)     Seizures     last one when she was 20 years old     Past Surgical History:   Procedure Laterality Date    HERNIA REPAIR      TUBAL LIGATION       Family History   Problem Relation Age of Onset    Asthma Mother     Asthma Sister      Social History     Tobacco Use    Smoking status: Never Smoker    Smokeless tobacco: Never Used   Substance Use Topics    Alcohol use: Yes     Comment: occasional    Drug use: Yes     Types: Marijuana     Review of Systems   Constitutional: Negative for fever.   Eyes: Negative for photophobia and visual disturbance.   Respiratory: Negative for shortness of breath.    Cardiovascular: Negative for chest pain and palpitations.   Gastrointestinal: Negative for nausea and vomiting.    Musculoskeletal: Negative for neck pain and neck stiffness.   Neurological: Positive for dizziness, weakness, light-headedness and headaches. Negative for facial asymmetry.       Physical Exam     Initial Vitals [02/15/22 0008]   BP Pulse Resp Temp SpO2   (!) 169/113 74 20 98 °F (36.7 °C) 98 %      MAP       --         Physical Exam    Nursing note and vitals reviewed.  Constitutional: She appears well-developed and well-nourished. She is not diaphoretic. No distress.   Uncomfortable-appearing, nontoxic.    HENT:   Head: Normocephalic and atraumatic.   Eyes: EOM are normal.   Neck: Neck supple.   Normal range of motion.  Cardiovascular: Intact distal pulses.   Pulmonary/Chest: No respiratory distress.   Musculoskeletal:      Cervical back: Normal range of motion and neck supple.     Neurological: She is alert and oriented to person, place, and time. GCS score is 15. GCS eye subscore is 4. GCS verbal subscore is 5. GCS motor subscore is 6.   LUE strength 3/5  RUE strength 4/5    LLE strength 2/5  RLE strength 5/5    Able to heel and toe walk. Negative romberg, no pronator drift. Normal, steady gait.    Skin: Skin is warm. Capillary refill takes less than 2 seconds.   Psychiatric: She has a normal mood and affect. Thought content normal.         ED Course   Procedures  Labs Reviewed   CBC W/ AUTO DIFFERENTIAL - Abnormal; Notable for the following components:       Result Value    Hemoglobin 10.1 (*)     Hematocrit 34.2 (*)     MCV 78 (*)     MCH 23.1 (*)     MCHC 29.5 (*)     RDW 16.4 (*)     Mono # 1.1 (*)     All other components within normal limits   TSH - Abnormal; Notable for the following components:    TSH 9.181 (*)     All other components within normal limits   COMPREHENSIVE METABOLIC PANEL   PROTIME-INR   LIPID PANEL   MAGNESIUM   T4, FREE   DRUG SCREEN PANEL, URINE EMERGENCY   URINALYSIS, REFLEX TO URINE CULTURE   HEMOGLOBIN A1C   TROPONIN I   CK-MB   APTT   PROTIME-INR   POCT GLUCOSE, HAND-HELD DEVICE    SARS-COV-2 RDRP GENE   POCT URINE PREGNANCY   POCT GLUCOSE     EKG Readings: (Independently Interpreted)   Normal sinus rhythm, ventricular rate 62 beats per minute.  Normal IN.  Normal QT.  No right axis deviation.  No ST elevation.  No gross change from previous dated 09/14/2021.        Imaging Results          CT Head Without Contrast (Final result)  Result time 02/15/22 03:27:37    Final result by Ifeanyi Donovan MD (02/15/22 03:27:37)                 Impression:      No CT evidence of acute intracranial abnormality. Clinical correlation and further evaluation as warranted.    Paranasal sinus disease as above.      Electronically signed by: Ifeanyi Donovan MD  Date:    02/15/2022  Time:    03:27             Narrative:    EXAMINATION:  CT HEAD WITHOUT CONTRAST    CLINICAL HISTORY:  Neuro deficit, acute, stroke suspected;    TECHNIQUE:  Low dose axial images were obtained through the head.  Coronal and sagittal reformations were also performed. Contrast was not administered.    COMPARISON:  CT head 02/12/2019, MRI brain 02/13/2019    FINDINGS:  There is no acute intracranial hemorrhage, hydrocephalus, midline shift or mass effect. Gray-white matter differentiation appears maintained. The basal cisterns are patent. There is partial opacification of the anterior ethmoid air cells and left sphenoid sinus.  The remaining paranasal sinuses and mastoid air cells appear relatively well aerated.  The calvarium is intact.  There is a stable nonspecific subcutaneous nodule within the right paramidline frontal scalp.                               X-Ray Chest AP Portable (Final result)  Result time 02/15/22 02:58:44    Final result by Ifeanyi Donovan MD (02/15/22 02:58:44)                 Impression:      No radiographic evidence of acute intrathoracic process on this single view.      Electronically signed by: Ifeanyi Donovan MD  Date:    02/15/2022  Time:    02:58             Narrative:    EXAMINATION:  XR CHEST AP  PORTABLE    CLINICAL HISTORY:  Stroke;    TECHNIQUE:  Single frontal view of the chest was performed.    COMPARISON:  09/14/2021    FINDINGS:  Cardiac monitoring leads overlie the chest.  Cardiac silhouette appears within normal limits.  Lungs are symmetrically expanded without evidence of confluent airspace consolidation.  No significant volume of pleural fluid or pneumothorax identified.  Visualized osseous structures appear intact.                                 Medications   amLODIPine tablet 5 mg (5 mg Oral Given 2/15/22 0402)   losartan split tablet 12.5 mg (12.5 mg Oral Given 2/15/22 0409)   hydrALAZINE injection 5 mg (has no administration in time range)   sodium chloride 0.9% flush 10 mL (has no administration in time range)   enoxaparin injection 40 mg (has no administration in time range)   aspirin EC tablet 81 mg (has no administration in time range)   levothyroxine tablet 25 mcg (has no administration in time range)   ferrous sulfate tablet 1 each (has no administration in time range)   docusate sodium capsule 100 mg (has no administration in time range)   butalbital-acetaminophen-caffeine -40 mg per tablet 1 tablet (1 tablet Oral Given 2/15/22 0303)     Medical Decision Making:   Differential Diagnosis:   Complex migraine, TIA, CVA  Clinical Tests:   Lab Tests: Ordered and Reviewed  Radiological Study: Ordered and Reviewed  Medical Tests: Ordered and Reviewed  ED Management:  Hx similar weakness with HA in the past. Denies persistent or chronic L sided weakness. There is appreciable L sided weakness compared to R on exam today. Will w/u as non-acute CVA. Plan to admit to , consult neurology for reevaluation given recurrence, MRI in AM.                       Clinical Impression:   Final diagnoses:  [R51.9] Acute nonintractable headache, unspecified headache type (Primary)  [R53.1] Left-sided weakness  [G45.9] TIA (transient ischemic attack)          ED Disposition Condition    Observation                       Nicolas Miramontes PA-C  02/15/22 0540

## 2022-02-15 NOTE — PLAN OF CARE
02/15/22 1141   Discharge Planning   Assessment Type Discharge Planning Brief Assessment   Resource/Environmental Concerns none   Support Systems Parent;Family members   Equipment Currently Used at Home none   Current Living Arrangements home/apartment/condo   Patient/Family Anticipates Transition to home   Patient/Family Anticipated Services at Transition none   DME Needed Upon Discharge  none   Discharge Plan A Home with family  (with [facility])     [facility] - [place] - [address]  [address] [place] [address]  Phone: [phone] Fax: [phone]    [facility] [id number] [place], [place] - [address] [facility] [address] [place] [address]  [facility] [address]  Phone: [phone] Fax: [phone]

## 2022-02-15 NOTE — PT/OT/SLP EVAL
"Speech Language Pathology Evaluation  Bedside Swallow    Patient Name:  Carol Yarbrough   MRN:  [id number]  Admitting Diagnosis: Acute nonintractable headache    Recommendations:                 General Recommendations:  Follow-up not indicated  Diet recommendations:  Regular, Thin   Aspiration Precautions: 1 bite/sip at a time   General Precautions: Standard,    Communication strategies:  none    History:     Past Medical History:   Diagnosis Date    Anemia     Encounter for blood transfusion     History of seizure disorder     HTN (hypertension)     Seizures     last one when she was 20 years old       Past Surgical History:   Procedure Laterality Date    HERNIA REPAIR      TUBAL LIGATION         Social History: Patient (I) for ADLs.    Chest X-Rays: 2/15 No radiographic evidence of acute intrathoracic process on this single view.     Prior diet: unrestricted    Subjective   Pt reporting no issues with language, articulation, or swallowing, she easily passed cognitive linguistic screen.   Patient goals: d/c     Pain/Comfort:  · Pain Rating 1: 0/10    Respiratory Status: Room air    Objective:     Oral Musculature Evaluation  · Oral Musculature: WFL  · Dentition: present and adequate  · Secretion Management: adequate  · Mucosal Quality: good  · Mandibular Strength and Mobility: WFL  · Oral Labial Strength and Mobility: WFL  · Lingual Strength and Mobility: WFL  · Buccal Strength and Mobility: WFL  · Voice Prior to PO Intake: wfl  · Oral Musculature Comments: wfl    Bedside Swallow Eval:   Consistencies Assessed:  · Thin liquids 4oz via straw  · Solids X2     Oral Phase:   · WFL    Pharyngeal Phase:   · no overt clinical signs/symptoms of aspiration    Compensatory Strategies  · None    Treatment: please note silent aspiration cannot be r/o at bedside. Pt reporting she will tell her nurse if there is a change in her ability to speak such as "slur" below baseline which is considered a possible CVA symptom. "     Handout attached to chart.    At present Pt's articulation is at baseline.     Assessment:     Carol Yarbrough is a 47 y.o. female with dx of Acute nonintractable headache she presents with function swallow, language and articulation. No further ST is warranted.     Goals:   Multidisciplinary Problems     SLP Goals     Not on file          Multidisciplinary Problems (Resolved)        Problem: SLP Goal    Goal Priority Disciplines Outcome   SLP Goal   (Resolved)    Low SLP Met   Description: Pt will participate in speech language eval                   Plan:   ·   · Plan of Care expires:  02/15/22  · Plan of Care reviewed with:  patient   · SLP Follow-Up:  No       Discharge recommendations:  home   Barriers to Discharge:  None    Time Tracking:     SLP Treatment Date:   02/15/22  Speech Start Time:  1550  Speech Stop Time:  1600     Speech Total Time (min):  10 min    Billable Minutes: Eval Swallow and Oral Function 10    02/15/2022

## 2022-02-15 NOTE — PT/OT/SLP PROGRESS
Occupational Therapy      Patient Name:  Carol Yarbrough   MRN:  [id number]    Patient not seen early am today secondary to /114.   Pm follow up 1:20 pm /130.   OT will continue to follow-up this date.    2/15/2022

## 2022-02-16 VITALS
HEART RATE: 66 BPM | HEIGHT: 61 IN | WEIGHT: 167 LBS | RESPIRATION RATE: 18 BRPM | SYSTOLIC BLOOD PRESSURE: 139 MMHG | DIASTOLIC BLOOD PRESSURE: 94 MMHG | OXYGEN SATURATION: 100 % | TEMPERATURE: 98 F | BODY MASS INDEX: 31.53 KG/M2

## 2022-02-16 LAB
ALBUMIN SERPL BCP-MCNC: 3.8 G/DL (ref 3.5–5.2)
ALP SERPL-CCNC: 83 U/L (ref 55–135)
ALT SERPL W/O P-5'-P-CCNC: 15 U/L (ref 10–44)
ANION GAP SERPL CALC-SCNC: 9 MMOL/L (ref 8–16)
AST SERPL-CCNC: 14 U/L (ref 10–40)
BASOPHILS # BLD AUTO: 0.06 K/UL (ref 0–0.2)
BASOPHILS NFR BLD: 0.7 % (ref 0–1.9)
BILIRUB SERPL-MCNC: 1.2 MG/DL (ref 0.1–1)
BUN SERPL-MCNC: 11 MG/DL (ref 6–20)
CALCIUM SERPL-MCNC: 8.9 MG/DL (ref 8.7–10.5)
CHLORIDE SERPL-SCNC: 107 MMOL/L (ref 95–110)
CO2 SERPL-SCNC: 22 MMOL/L (ref 23–29)
CREAT SERPL-MCNC: 0.8 MG/DL (ref 0.5–1.4)
DIFFERENTIAL METHOD: ABNORMAL
EOSINOPHIL # BLD AUTO: 0.1 K/UL (ref 0–0.5)
EOSINOPHIL NFR BLD: 1.5 % (ref 0–8)
ERYTHROCYTE [DISTWIDTH] IN BLOOD BY AUTOMATED COUNT: 16.7 % (ref 11.5–14.5)
EST. GFR  (AFRICAN AMERICAN): >60 ML/MIN/1.73 M^2
EST. GFR  (NON AFRICAN AMERICAN): >60 ML/MIN/1.73 M^2
GLUCOSE SERPL-MCNC: 96 MG/DL (ref 70–110)
HCT VFR BLD AUTO: 36 % (ref 37–48.5)
HGB BLD-MCNC: 10.7 G/DL (ref 12–16)
IMM GRANULOCYTES # BLD AUTO: 0.02 K/UL (ref 0–0.04)
IMM GRANULOCYTES NFR BLD AUTO: 0.2 % (ref 0–0.5)
LYMPHOCYTES # BLD AUTO: 2.2 K/UL (ref 1–4.8)
LYMPHOCYTES NFR BLD: 26.7 % (ref 18–48)
MAGNESIUM SERPL-MCNC: 2 MG/DL (ref 1.6–2.6)
MCH RBC QN AUTO: 23.3 PG (ref 27–31)
MCHC RBC AUTO-ENTMCNC: 29.7 G/DL (ref 32–36)
MCV RBC AUTO: 78 FL (ref 82–98)
MONOCYTES # BLD AUTO: 0.9 K/UL (ref 0.3–1)
MONOCYTES NFR BLD: 10.7 % (ref 4–15)
NEUTROPHILS # BLD AUTO: 5 K/UL (ref 1.8–7.7)
NEUTROPHILS NFR BLD: 60.2 % (ref 38–73)
NRBC BLD-RTO: 0 /100 WBC
PHOSPHATE SERPL-MCNC: 3.3 MG/DL (ref 2.7–4.5)
PLATELET # BLD AUTO: 342 K/UL (ref 150–450)
PMV BLD AUTO: 10.4 FL (ref 9.2–12.9)
POTASSIUM SERPL-SCNC: 3.5 MMOL/L (ref 3.5–5.1)
PROT SERPL-MCNC: 8 G/DL (ref 6–8.4)
RBC # BLD AUTO: 4.59 M/UL (ref 4–5.4)
SODIUM SERPL-SCNC: 138 MMOL/L (ref 136–145)
WBC # BLD AUTO: 8.39 K/UL (ref 3.9–12.7)

## 2022-02-16 PROCEDURE — 99214 OFFICE O/P EST MOD 30 MIN: CPT | Mod: ,,, | Performed by: PSYCHIATRY & NEUROLOGY

## 2022-02-16 PROCEDURE — 83735 ASSAY OF MAGNESIUM: CPT | Performed by: NURSE PRACTITIONER

## 2022-02-16 PROCEDURE — 99214 PR OFFICE/OUTPT VISIT, EST, LEVL IV, 30-39 MIN: ICD-10-PCS | Mod: ,,, | Performed by: PSYCHIATRY & NEUROLOGY

## 2022-02-16 PROCEDURE — 85025 COMPLETE CBC W/AUTO DIFF WBC: CPT | Performed by: NURSE PRACTITIONER

## 2022-02-16 PROCEDURE — 25000003 PHARM REV CODE 250: Performed by: NURSE PRACTITIONER

## 2022-02-16 PROCEDURE — 84100 ASSAY OF PHOSPHORUS: CPT | Performed by: NURSE PRACTITIONER

## 2022-02-16 PROCEDURE — 25000003 PHARM REV CODE 250: Performed by: STUDENT IN AN ORGANIZED HEALTH CARE EDUCATION/TRAINING PROGRAM

## 2022-02-16 PROCEDURE — 36415 COLL VENOUS BLD VENIPUNCTURE: CPT | Performed by: NURSE PRACTITIONER

## 2022-02-16 PROCEDURE — 80053 COMPREHEN METABOLIC PANEL: CPT | Performed by: NURSE PRACTITIONER

## 2022-02-16 PROCEDURE — G0378 HOSPITAL OBSERVATION PER HR: HCPCS

## 2022-02-16 PROCEDURE — 97161 PT EVAL LOW COMPLEX 20 MIN: CPT

## 2022-02-16 RX ORDER — BUTALBITAL, ACETAMINOPHEN AND CAFFEINE 50; 325; 40 MG/1; MG/1; MG/1
1 TABLET ORAL EVERY 4 HOURS PRN
Qty: 10 TABLET | Refills: 0 | Status: SHIPPED | OUTPATIENT
Start: 2022-02-16 | End: 2022-03-18

## 2022-02-16 RX ORDER — LOSARTAN POTASSIUM 25 MG/1
50 TABLET ORAL DAILY
Status: DISCONTINUED | OUTPATIENT
Start: 2022-02-16 | End: 2022-02-16 | Stop reason: HOSPADM

## 2022-02-16 RX ORDER — AMLODIPINE AND OLMESARTAN MEDOXOMIL 5; 20 MG/1; MG/1
1 TABLET ORAL DAILY
Qty: 30 TABLET | Refills: 3 | Status: SHIPPED | OUTPATIENT
Start: 2022-02-16 | End: 2024-01-14

## 2022-02-16 RX ORDER — CARVEDILOL 6.25 MG/1
6.25 TABLET ORAL 2 TIMES DAILY
Status: DISCONTINUED | OUTPATIENT
Start: 2022-02-16 | End: 2022-02-16

## 2022-02-16 RX ORDER — AMLODIPINE AND OLMESARTAN MEDOXOMIL 5; 20 MG/1; MG/1
1 TABLET ORAL DAILY
Qty: 30 TABLET | Refills: 2 | Status: SHIPPED | OUTPATIENT
Start: 2022-02-16 | End: 2022-02-16

## 2022-02-16 RX ORDER — FERROUS SULFATE 325(65) MG
325 TABLET, DELAYED RELEASE (ENTERIC COATED) ORAL DAILY
Qty: 30 TABLET | Refills: 0 | Status: SHIPPED | OUTPATIENT
Start: 2022-02-16 | End: 2023-04-09

## 2022-02-16 RX ORDER — AMLODIPINE BESYLATE 5 MG/1
5 TABLET ORAL DAILY
Status: DISCONTINUED | OUTPATIENT
Start: 2022-02-16 | End: 2022-02-16 | Stop reason: HOSPADM

## 2022-02-16 RX ADMIN — AMLODIPINE BESYLATE 5 MG: 5 TABLET ORAL at 09:02

## 2022-02-16 RX ADMIN — DOCUSATE SODIUM 100 MG: 100 CAPSULE ORAL at 09:02

## 2022-02-16 RX ADMIN — LOSARTAN POTASSIUM 50 MG: 25 TABLET, FILM COATED ORAL at 09:02

## 2022-02-16 RX ADMIN — ASPIRIN 81 MG: 81 TABLET, DELAYED RELEASE ORAL at 09:02

## 2022-02-16 RX ADMIN — FERROUS SULFATE TAB 325 MG (65 MG ELEMENTAL FE) 1 EACH: 325 (65 FE) TAB at 09:02

## 2022-02-16 NOTE — PROGRESS NOTES
WRITTEN HEALTHCARE DISCHARGE INFORMATION      Things that YOU are RESPONSIBLE for to Manage Your Care At Home:     1. Getting your prescriptions filled.  2. Taking you medications as directed. DO NOT MISS ANY DOSES!  3. Going to your follow-up doctor appointments. This is important because it allows the doctor to monitor your progress and to determine if any changes need to be made to your treatment plan.     If you are unable to make your follow up appointments, please call the number listed and reschedule this appointment.      ____________HELP AT HOME____________________     Experiencing any SIGNS or SYMPTOMS: YOU CAN     Schedule a same day appopintment with your Primary Care Doctor or  you can call [facility] On Call Nurse Care Line for 24/7 assistance at [phone]     If you are experience any signs or symptoms that have become severe, Call 911 and come to your nearest Emergency Room.     Thank you for choosing [facility] and allowing us to care for you.   From your care management team:      You should receive a call from [facility] Discharge Department within 48-72 hours to help manage your care after discharge. Please try to make sure that you answer your phone for this important phone call.       Follow-up Information     [facility]    Why: call at time of discharge to schedule followup and establish care for future medical needs  Contact information:  [address]  [place] [address]  [phone]             [facility]    Specialty: Outpatient Rehab  Why: you will be contacted by office to schedule appointment to start services  Contact information:  [address]  [facility] [address] [phone]  Additional information:  Please park in surface lot and use [facility] entrance. Check in at main registration.

## 2022-02-16 NOTE — HOSPITAL COURSE
Placed under observation for uncontrolled hypertension and persistent headache. She underwent MRI brain which did not show any acute findings. Blood pressure medications were restarted and slowly improved. Neurology consulted, concerning for possible medication overuse headache. Plan to follow up with outpatient Neurology. She was discharged home in stable condition once blood pressure better controlled - new prescriptions sent for patient.

## 2022-02-16 NOTE — NURSING
Discharge instructions given to patient prior to discharge. Patient states understanding of information provided. IV and tele discontinued. Tolerated well. All personal belongings with patient at the time of discharge. Transported to personal vehicle via hospital transport wheelchair.

## 2022-02-16 NOTE — PT/OT/SLP PROGRESS
Occupational Therapy      Patient Name:  Carol Yarbrough   MRN:  [id number]    Patient not seen today secondary to nursing care discharge prep ongoing. OT will follow-up if/as indicated.    2/16/2022

## 2022-02-16 NOTE — NURSING
Bedside Report given to night nurse ORLANDO Roque. Walking rounds completed. Visualized and assessed patient NAD noted. Safety precautions maintained and call light within reach.     Chart check completed.

## 2022-02-16 NOTE — NURSING
Report given MACK Wood, pt in bed resting, respirations even and unlabored, no acute distress,  safety precautions in place. Pt blood pressure has been elevated all day meds given to trend BP down. Vital signs stable. Pt had complaining of a headache, Fioricet given. Pt transported to the floor via wheel chair with belongings. Telemetry boy 0325

## 2022-02-16 NOTE — NURSING
After pt arrived in the room, pt began to feel nausous and started vomiting reddish brown emesis. Will report to oncoming  To follow up.

## 2022-02-16 NOTE — PROGRESS NOTES
[facility] - [place] - Telemetry  Neurology  Progress Note    Patient Name: Carol Yarbrough  MRN: [id number]  Admission Date: 2/15/2022  Hospital Length of Stay: 0 days  Code Status: Full Code   Attending Provider: Davis Sarmiento MD  Primary Care Physician: To Obtain Unable   Principal Problem:Acute nonintractable headache    Subjective:     Interval History: 48 y/o female with PMHx of HTN comes to ED due complaint left sided frontal headache. This headache has been intermittent. Partial relief taking ibuprofen. Upon arrival to ED her BP was 160-200/'s. Pt reports no visual or speech disturbance, vertigo, numbness of limbs. She does reports weakness on left side for two weeks.    -2/16/22: Mild headache. Denies speech or visual disturbances, weakness or numbness of limbs.    Current Neurological Medications:     Current Facility-Administered Medications   Medication Dose Route Frequency Provider Last Rate Last Admin    amLODIPine tablet 5 mg  5 mg Oral Daily Davis Sarmiento MD   5 mg at 02/16/22 0928    aspirin EC tablet 81 mg  81 mg Oral Daily Dinoicia S. Huy, NP   81 mg at 02/16/22 0928    butalbital-acetaminophen-caffeine -40 mg per tablet 1 tablet  1 tablet Oral Q4H PRN Davis Sarmiento MD   1 tablet at 02/15/22 1804    docusate sodium capsule 100 mg  100 mg Oral Daily Manicia S. Huy, NP   100 mg at 02/16/22 0928    enoxaparin injection 40 mg  40 mg Subcutaneous Daily Manicia S. Huy, NP   40 mg at 02/15/22 1629    ferrous sulfate tablet 1 each  1 tablet Oral Daily Dinoicia S. Huy, NP   1 each at 02/16/22 0928    hydrALAZINE injection 10 mg  10 mg Intravenous Q6H PRN Davis Sarmiento MD   10 mg at 02/15/22 1552    labetaloL injection 10 mg  10 mg Intravenous Q4H PRN Davis Sarmiento MD   10 mg at 02/15/22 1148    losartan tablet 50 mg  50 mg Oral Daily Davis Sarmiento MD   50 mg at 02/16/22 0927    ondansetron disintegrating tablet 4 mg  4 mg Oral Q6H PRN Davis Sarmiento MD   4 mg at  02/15/22 2056    sodium chloride 0.9% flush 10 mL  10 mL Intravenous PRN Brigido Son NP           Review of Systems   Constitutional: Negative for fever.   HENT: Negative for trouble swallowing.    Eyes: Negative for photophobia.   Respiratory: Negative for shortness of breath.    Cardiovascular: Negative for chest pain.   Gastrointestinal: Negative for abdominal pain.   Genitourinary: Negative for dysuria.   Musculoskeletal: Negative for back pain.   Neurological: Positive for headaches.   Psychiatric/Behavioral: Negative for confusion.     Objective:     Vital Signs (Most Recent):  Temp: 98.2 °F (36.8 °C) (02/16/22 1236)  Pulse: 87 (02/16/22 1236)  Resp: 18 (02/16/22 1236)  BP: (!) 176/114 (02/16/22 1236)  SpO2: 100 % (02/16/22 1236) Vital Signs (24h Range):  Temp:  [97.5 °F (36.4 °C)-98.9 °F (37.2 °C)] 98.2 °F (36.8 °C)  Pulse:  [] 87  Resp:  [15-46] 18  SpO2:  [99 %-100 %] 100 %  BP: (110-185)/() 176/114     Weight: 75.8 kg (167 lb)  Body mass index is 31.55 kg/m².    Physical Exam  Constitutional:       General: She is not in acute distress.  HENT:      Head: Normocephalic.      Right Ear: External ear normal.      Left Ear: External ear normal.   Eyes:      General:         Right eye: No discharge.         Left eye: No discharge.   Cardiovascular:      Rate and Rhythm: Normal rate.   Pulmonary:      Breath sounds: Normal breath sounds.   Abdominal:      Palpations: Abdomen is soft.   Musculoskeletal:         General: No tenderness.      Cervical back: Neck supple.   Skin:     General: Skin is warm.   Neurological:      Mental Status: She is oriented to person, place, and time.      Deep Tendon Reflexes: Strength normal.   Psychiatric:         Speech: Speech normal.            NEUROLOGICAL EXAMINATION:      MENTAL STATUS   Oriented to person, place, and time.   Speech: speech is normal   Level of consciousness: alert     CRANIAL NERVES      CN III, IV, VI   Right pupil: Size: 2 mm. Shape:  regular.   Left pupil: Size: 2 mm. Shape: regular.   Nystagmus: none   Ophthalmoparesis: none  Conjugate gaze: present     CN VII   Right facial weakness: none  Left facial weakness: none     CN XII   Tongue deviation: none     MOTOR EXAM      Strength   Strength 5/5 throughout.              Significant Labs: CBC: No results for input(s): WBC, HGB, HCT, PLT in the last 48 hours.  CMP: No results for input(s): GLU, NA, K, CL, CO2, BUN, CREATININE, CALCIUM, MG, PROT, ALBUMIN, BILITOT, ALKPHOS, AST, ALT, ANIONGAP, EGFRNONAA in the last 48 hours.    Significant Imaging: I have reviewed all pertinent imaging results/findings within the past 24 hours.    Assessment and Plan:     48 y/o female consulted for left sided weakness     1. TIA? episode could had been TIA but not clear.  Perhaps a combination of headache and elevated BP.           MRI brain showed no stroke. Echo: EF 65%, mild LAE.           -ASA 81 mg daily.           -Fioricet every 8 hours PRN headache.   -No further recs. OK to D/C home.    Active Diagnoses:    Diagnosis Date Noted POA    PRINCIPAL PROBLEM:  Acute nonintractable headache [R51.9] 02/15/2022 Unknown    Hypothyroidism [E03.9] 02/15/2022 Unknown    Anemia [D64.9] 02/15/2022 Unknown    Weakness of left lower extremity [R29.898] 02/12/2019 Yes    Essential hypertension, benign [I10] 03/25/2015 Yes      Problems Resolved During this Admission:       VTE Risk Mitigation (From admission, onward)         Ordered     enoxaparin injection 40 mg  Daily         02/15/22 0500     IP VTE HIGH RISK PATIENT  Once         02/15/22 0500     Place sequential compression device  Until discontinued         02/15/22 0500                Trung Antony MD  Neurology  [facility] - Telemetry

## 2022-02-16 NOTE — PLAN OF CARE
Problem: Physical Therapy Goal  Goal: Physical Therapy Goal  Outcome: Adequate for Care Transition   Initial PT evaluation performed today.  Pt OK for D/C home from PT standpoint.  Outpatient PT recommended for treatment of Headaches.  PT to sign off.  OK for D/C home from PT standpoint.

## 2022-02-16 NOTE — NURSING
Received report from ORLANDO Savage from ED. Pt AAOx4. /94, HR 85, O2 100% on RA, T 98, R 17. Awaiting pt's arrival to the floor

## 2022-02-16 NOTE — PLAN OF CARE
02/16/22 0923   Final Note   Assessment Type Final Discharge Note   Anticipated Discharge Disposition Home   Hospital Resources/Appts/Education Provided Community resources provided   Post-Acute Status   Post-Acute Authorization Other   Other Status No Post-Acute Service Needs   Pts nurse Luiza notified that the pt can d/c from CM standpoint.

## 2022-02-16 NOTE — PLAN OF CARE
Problem: Adult Inpatient Plan of Care  Goal: Plan of Care Review  Outcome: Ongoing, Progressing  Goal: Patient-Specific Goal (Individualized)  Outcome: Ongoing, Progressing  Goal: Absence of Hospital-Acquired Illness or Injury  Outcome: Ongoing, Progressing  Goal: Optimal Comfort and Wellbeing  Outcome: Ongoing, Progressing  Goal: Readiness for Transition of Care  Outcome: Ongoing, Progressing     Problem: Infection  Goal: Absence of Infection Signs and Symptoms  Outcome: Ongoing, Progressing     Problem: Adjustment to Illness (Stroke, Ischemic/Transient Ischemic Attack)  Goal: Optimal Coping  Outcome: Ongoing, Progressing     Problem: Bowel Elimination Impaired (Stroke, Ischemic/Transient Ischemic Attack)  Goal: Effective Bowel Elimination  Outcome: Ongoing, Progressing     Problem: Cerebral Tissue Perfusion (Stroke, Ischemic/Transient Ischemic Attack)  Goal: Optimal Cerebral Tissue Perfusion  Outcome: Ongoing, Progressing     Problem: Cognitive Impairment (Stroke, Ischemic/Transient Ischemic Attack)  Goal: Optimal Cognitive Function  Outcome: Ongoing, Progressing     Problem: Communication Impairment (Stroke, Ischemic/Transient Ischemic Attack)  Goal: Improved Communication Skills  Outcome: Ongoing, Progressing     Problem: Functional Ability Impaired (Stroke, Ischemic/Transient Ischemic Attack)  Goal: Optimal Functional Ability  Outcome: Ongoing, Progressing     Problem: Respiratory Compromise (Stroke, Ischemic/Transient Ischemic Attack)  Goal: Effective Oxygenation and Ventilation  Outcome: Ongoing, Progressing     Problem: Sensorimotor Impairment (Stroke, Ischemic/Transient Ischemic Attack)  Goal: Improved Sensorimotor Function  Outcome: Ongoing, Progressing     Problem: Swallowing Impairment (Stroke, Ischemic/Transient Ischemic Attack)  Goal: Optimal Eating and Swallowing without Aspiration  Outcome: Ongoing, Progressing     Problem: Urinary Elimination Impaired (Stroke, Ischemic/Transient Ischemic  Attack)  Goal: Effective Urinary Elimination  Outcome: Ongoing, Progressing

## 2022-02-16 NOTE — PT/OT/SLP EVAL
Physical Therapy Evaluation and Discharge Note    Patient Name:  Carol Yarbrough   MRN:  [id number]    Recommendations:     Discharge Recommendations:  outpatient PT (to address HA)   Discharge Equipment Recommendations: none   Barriers to discharge: None from PT standpoint    Assessment:     Carol Yarbrough is a 47 y.o. female admitted with a medical diagnosis of Acute nonintractable headache. .  At this time, patient is functioning at their prior level of function and does not require further acute PT services.     Recent Surgery: * No surgery found *      Plan:     During this hospitalization, patient does not require further acute PT services.  Please re-consult if situation changes.      Subjective     Chief Complaint: HA  Patient/Family Comments/goals: Decreased pain  Pain/Comfort:  · Pain Rating 1: 8/10  · Location - Orientation 1:  (Head Ache)  · Pain Addressed 1: Reposition,Distraction,Cessation of Activity,Nurse notified (Neck roll, pursed lip breathing)    Patients cultural, spiritual, Anglican conflicts given the current situation: no    Living Environment:  Pt lives with children in a 2nd floor apt with HR available on stairs    Prior to admission, patients level of function was Independent/employed.  Equipment used at home: none.  DME owned (not currently used): none.  Upon discharge, patient will have assistance from Children.    Objective:     Communicated with nsg prior to session.  Patient found HOB elevated with telemetry upon PT entry to room.    General Precautions: Standard, fall   Orthopedic Precautions:N/A   Braces: N/A   Respiratory Status: Room air    Exams:  · Cognitive Exam:  Patient is oriented to Person, Place, Time and Situation  · Gross Motor Coordination:  WFL  · Postural Exam:  Patient presented with the following abnormalities:    · -       Forward head, rounded shsoulders  · Sensation:    · -       Intact  light/touch B LE's  · Skin Integrity/Edema:      · -       Skin  integrity: Visible skin intact  · RLE ROM: WFL  · RLE Strength: WFL  · LLE ROM: WFL  · LLE Strength: WFL    Functional Mobility:  · Bed Mobility:     · Scooting: modified independence  · Supine to Sit: modified independence  · Sit to Supine: modified independence  · Transfers:     · Sit to Stand:  modified independence with no AD  · Gait: 25' Mod I  · Balance: Good-    AM-PAC 6 CLICK MOBILITY  Total Score:23       Therapeutic Activities and Exercises:  Handout provided and reviewed with pt for Pursed lip breathing for increased Oxygenation, relaxation and pain reduction.  Pt able to return demonstration with VC.  Towel roll placed behind neck for cervical neutral and comfort while in bed.      AM-PAC 6 CLICK MOBILITY  Total Score:23     Patient left HOB elevated with all lines intact, call button in reach and nsg notified.    GOALS:   Multidisciplinary Problems     Physical Therapy Goals        Problem: Physical Therapy Goal    Goal Priority Disciplines Outcome Goal Variances Interventions   Physical Therapy Goal     PT, PT/OT Adequate for Care Transition                     History:     Past Medical History:   Diagnosis Date    Anemia     Encounter for blood transfusion     History of seizure disorder     HTN (hypertension)     Seizures     last one when she was 20 years old       Past Surgical History:   Procedure Laterality Date    HERNIA REPAIR      TUBAL LIGATION         Time Tracking:     PT Received On: 02/16/22  PT Start Time: 1019     PT Stop Time: 1034  PT Total Time (min): 15 min     Billable Minutes: Evaluation 15      02/16/2022

## 2022-03-28 ENCOUNTER — HOSPITAL ENCOUNTER (EMERGENCY)
Facility: HOSPITAL | Age: 47
Discharge: HOME | End: 2022-03-28
Attending: EMERGENCY MEDICINE
Payer: MEDICAID

## 2022-03-28 VITALS
HEIGHT: 62 IN | BODY MASS INDEX: 30.91 KG/M2 | HEART RATE: 72 BPM | DIASTOLIC BLOOD PRESSURE: 99 MMHG | TEMPERATURE: 99 F | SYSTOLIC BLOOD PRESSURE: 167 MMHG | OXYGEN SATURATION: 100 % | WEIGHT: 168 LBS | RESPIRATION RATE: 18 BRPM

## 2022-03-28 DIAGNOSIS — M79.18 LEFT BUTTOCK PAIN: ICD-10-CM
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
## 2022-03-28 DIAGNOSIS — M54.50 LUMBAR BACK PAIN: ICD-10-CM

## 2022-03-28 LAB
B-HCG UR QL: NEGATIVE
CTP QC/QA: YES

## 2022-03-28 PROCEDURE — 99284 EMERGENCY DEPT VISIT MOD MDM: CPT

## 2022-03-28 PROCEDURE — 81025 URINE PREGNANCY TEST: CPT | Performed by: NURSE PRACTITIONER

## 2022-03-28 PROCEDURE — 25000003 PHARM REV CODE 250: Performed by: PHYSICIAN ASSISTANT

## 2022-03-28 RX ORDER — NAPROXEN 500 MG/1
500 TABLET ORAL 2 TIMES DAILY PRN
Qty: 10 TABLET | Refills: 0 | Status: SHIPPED | OUTPATIENT
Start: 2022-03-28 | End: 2022-04-02

## 2022-03-28 RX ORDER — NAPROXEN 500 MG/1
500 TABLET ORAL
Status: COMPLETED | OUTPATIENT
Start: 2022-03-28 | End: 2022-03-28

## 2022-03-28 RX ORDER — ORPHENADRINE CITRATE 100 MG/1
100 TABLET, EXTENDED RELEASE ORAL EVERY 12 HOURS PRN
Qty: 12 TABLET | Refills: 0 | Status: SHIPPED | OUTPATIENT
Start: 2022-03-28 | End: 2022-04-03

## 2022-03-28 RX ORDER — LIDOCAINE 50 MG/G
1 PATCH TOPICAL
Status: DISCONTINUED | OUTPATIENT
Start: 2022-03-28 | End: 2022-03-28 | Stop reason: HOSPADM

## 2022-03-28 RX ADMIN — LIDOCAINE 1 PATCH: 50 PATCH TOPICAL at 06:03

## 2022-03-28 RX ADMIN — NAPROXEN 500 MG: 500 TABLET ORAL at 06:03
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
## 2022-03-28 NOTE — DISCHARGE INSTRUCTIONS
§ Please return to the Emergency Department for any new or worsening symptoms including: fever, chest pain, shortness of breath, loss of consciousness, dizziness, weakness, or any other concerns.     § Schedule an appointment for follow up with your Primary Care Doctor as soon as possible for a recheck of your symptoms. If you do not have one, contact the one listed on your discharge paperwork or call the [facility] Appointment Desk at [phone] to schedule an appointment.     § Please take all medication as prescribed. You have been prescribed Naproxen for pain. This is an Non-Steroidal Anti-Inflammatory (NSAID) Medication. Please do not take any additional NSAIDs while you are taking this medication including (Advil, Aleve, Motrin, Ibuprofen, Mobic\meloxicam, Naprosyn, Toradol, ketoralac, etc.). Please stop taking this medication if you experience: weakness, itching, yellow skin or eyes, joint pains, vomiting blood, blood or black stools, unusual weight gain, or swelling in your arms, legs, hands, or feet.     You have been prescribed Norflex (Orphenadrine) for muscle spasms/pain. Please do not take this medication while working, drinking alcohol, swimming, or while driving/operating heavy machinery. This medication may cause drowsiness, dizziness, impair judgment, and reduce physical capabilities.You should not drive, operate heavy machinery, or make life changing decisions while taking this medication.
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
## 2022-06-16 ENCOUNTER — HOSPITAL ENCOUNTER (EMERGENCY)
Facility: OTHER | Age: 47
Discharge: HOME | End: 2022-06-16
Attending: EMERGENCY MEDICINE
Payer: MEDICAID

## 2022-06-16 VITALS
SYSTOLIC BLOOD PRESSURE: 164 MMHG | WEIGHT: 167 LBS | HEIGHT: 61 IN | BODY MASS INDEX: 31.53 KG/M2 | DIASTOLIC BLOOD PRESSURE: 109 MMHG | OXYGEN SATURATION: 97 % | HEART RATE: 75 BPM | TEMPERATURE: 98 F | RESPIRATION RATE: 18 BRPM

## 2022-06-16 DIAGNOSIS — S91.119A TOE LACERATION: ICD-10-CM

## 2022-06-16 LAB
B-HCG UR QL: NEGATIVE
CTP QC/QA: YES

## 2022-06-16 PROCEDURE — 90471 IMMUNIZATION ADMIN: CPT | Performed by: PHYSICIAN ASSISTANT

## 2022-06-16 PROCEDURE — 63600175 PHARM REV CODE 636 W HCPCS: Performed by: PHYSICIAN ASSISTANT

## 2022-06-16 PROCEDURE — 81025 URINE PREGNANCY TEST: CPT | Performed by: EMERGENCY MEDICINE

## 2022-06-16 PROCEDURE — 12001 RPR S/N/AX/GEN/TRNK 2.5CM/<: CPT

## 2022-06-16 PROCEDURE — 25000003 PHARM REV CODE 250: Performed by: EMERGENCY MEDICINE

## 2022-06-16 PROCEDURE — 99284 EMERGENCY DEPT VISIT MOD MDM: CPT | Mod: 25

## 2022-06-16 PROCEDURE — 90715 TDAP VACCINE 7 YRS/> IM: CPT | Performed by: PHYSICIAN ASSISTANT

## 2022-06-16 RX ORDER — LIDOCAINE HYDROCHLORIDE 10 MG/ML
5 INJECTION INFILTRATION; PERINEURAL
Status: DISCONTINUED | OUTPATIENT
Start: 2022-06-16 | End: 2022-06-16

## 2022-06-16 RX ORDER — IBUPROFEN 600 MG/1
600 TABLET ORAL
Status: COMPLETED | OUTPATIENT
Start: 2022-06-16 | End: 2022-06-16

## 2022-06-16 RX ADMIN — CLOSTRIDIUM TETANI TOXOID ANTIGEN (FORMALDEHYDE INACTIVATED), CORYNEBACTERIUM DIPHTHERIAE TOXOID ANTIGEN (FORMALDEHYDE INACTIVATED), BORDETELLA PERTUSSIS TOXOID ANTIGEN (GLUTARALDEHYDE INACTIVATED), BORDETELLA PERTUSSIS FILAMENTOUS HEMAGGLUTININ ANTIGEN (FORMALDEHYDE INACTIVATED), BORDETELLA PERTUSSIS PERTACTIN ANTIGEN, AND BORDETELLA PERTUSSIS FIMBRIAE 2/3 ANTIGEN 0.5 ML: 5; 2; 2.5; 5; 3; 5 INJECTION, SUSPENSION INTRAMUSCULAR at 06:06

## 2022-06-16 RX ADMIN — IBUPROFEN 600 MG: 600 TABLET, FILM COATED ORAL at 05:06

## 2022-06-16 NOTE — ED NOTES
S: Pt reports that she accidentally hit right foot on a metal plate on a door and that the plate was hanging off and lacerated the right lateral side of her right great toe. Pt reports mild bleeding at time of injury, which occurred around 1500 today.     O: 47 YOF pt reports to ED complaining of right great toe pain following laceration obtained by metal door piece. Pt is aox4, abc's intact. Pt is ambulatory without assistance and answers questions fully in complete sentences. No active bleeding, pt has approx 2 cm laceration present, PMS intact.

## 2022-06-16 NOTE — FIRST PROVIDER EVALUATION
Emergency Department TeleTriage Encounter Note      CHIEF COMPLAINT    Chief Complaint   Patient presents with    Laceration     Pt c.o laceration to right great toe onset approx 1 hour ago on metal plate on door frame.  Pt states she was walking in store and metal plate on door cut her toe.  AAO x 3nadn skin w.d  pt has simple lac/puncture/abrasion to right great toe. No bleeding noted. No obvious deformity noted. Pt is UTD with tetanus       VITAL SIGNS   Initial Vitals [06/16/22 1559]   BP Pulse Resp Temp SpO2   (!) 164/109 75 18 98.1 °F (36.7 °C) 97 %      MAP       --            ALLERGIES    Review of patient's allergies indicates:  No Known Allergies    PROVIDER TRIAGE NOTE  This is a teletriage evaluation of a 47 y.o. female presenting to the ED with c/o right toe pain after opening door on toe with laceration- unsure of last Tdap (despite triage note), wants updated Tdap here.     PE:. Non-toxic/well-appearing. No respiratory distress, speaks in full sentences without issue. No active emesis nor cough. Normal eye contact and mentation.     Plan: tdap, XR. Further/augmented workup at discretion of examining provider.     All ED beds are full at present; patient notified of this status.  Patient seen and medically screened by TIMOTEO via teletriage. Orders initiated at triage to expedite care.  Patient is stable and will be placed in an ED bed when available.  Care will be transferred to an alternate provider when patient has been placed in an Exam Room further exam, additional orders, and disposition.         ORDERS  Labs Reviewed - No data to display    ED Orders (720h ago, onward)    Start Ordered     Status Ordering Provider    06/16/22 1615 06/16/22 1608  LIDOcaine HCL 10 mg/ml (1%) injection 5 mL  ED 1 Time         Ordered CARLOS A BARRON    06/16/22 1608 06/16/22 1608  X-Ray Foot Complete Right  1 time imaging         Ordered CARLOS A BARRON            Virtual Visit Note: The provider triage portion  of this emergency department evaluation and documentation was performed via [employer], a HIPAA-compliant telemedicine application, in concert with a tele-presenter in the room. A face to face patient evaluation with one of my colleagues will occur once the patient is placed in an emergency department room.      DISCLAIMER: This note was prepared with Spout voice recognition transcription software. Garbled syntax, mangled pronouns, and other bizarre constructions may be attributed to that software system.

## 2022-06-16 NOTE — ED TRIAGE NOTES
Chief Complaint   Patient presents with    Laceration     Pt c.o laceration to right great toe onset approx 1 hour ago on metal plate on door frame.  Pt states she was walking in store and metal plate on door cut her toe.  AAO x 3nadn skin w.d  pt has simple lac/puncture/abrasion to right great toe. No bleeding noted. No obvious deformity noted. Pt is UTD with tetanus     Pt presents to ED with tiny laceration to R big toe. AAOX4

## 2022-06-16 NOTE — ED PROVIDER NOTES
Encounter Date: 6/16/2022       History     Chief Complaint   Patient presents with    Laceration     Pt c.o laceration to right great toe onset approx 1 hour ago on metal plate on door frame.  Pt states she was walking in store and metal plate on door cut her toe.  AAO x 3nadn skin w.d  pt has simple lac/puncture/abrasion to right great toe. No bleeding noted. No obvious deformity noted. Pt is UTD with tetanus     47-year-old female with history of hypertension presents for evaluation of right 1st toe laceration.  She was walking into a store and they metal plate on the edge of the door scraped her toe causing the laceration, bleeding controlled with pressure.  She drove straight here, no other injuries or complaints.  She is unsure of her tetanus status.        Review of patient's allergies indicates:  No Known Allergies  Past Medical History:   Diagnosis Date    Anemia     Encounter for blood transfusion     History of seizure disorder     HTN (hypertension)     Seizures     last one when she was 20 years old     Past Surgical History:   Procedure Laterality Date    HERNIA REPAIR      TUBAL LIGATION       Family History   Problem Relation Age of Onset    Asthma Mother     Asthma Sister      Social History     Tobacco Use    Smoking status: Never Smoker    Smokeless tobacco: Never Used   Substance Use Topics    Alcohol use: Yes     Comment: occasional    Drug use: Yes     Types: Marijuana     Review of Systems   Musculoskeletal: Negative for back pain.   Skin: Positive for wound.   Neurological: Negative for headaches.   Psychiatric/Behavioral: Negative for confusion.       Physical Exam     Initial Vitals [06/16/22 1559]   BP Pulse Resp Temp SpO2   (!) 164/109 75 18 98.1 °F (36.7 °C) 97 %      MAP       --         Physical Exam    Constitutional: She appears well-developed and well-nourished. She is not diaphoretic. No distress.   HENT:   Head: Normocephalic and atraumatic.   Eyes: Conjunctivae are  normal.   Cardiovascular: S1 normal and S2 normal.     Neurological: She is alert and oriented to person, place, and time.   Skin: Skin is warm and dry.   Right distal lateral 1st toe with 1.5 cm superficial laceration with some exposed fat, no active bleeding   Psychiatric: She has a normal mood and affect. Her behavior is normal. Judgment and thought content normal.         ED Course   Lac Repair    Date/Time: 6/16/2022 4:45 PM  Performed by: Burton Stahl MD  Authorized by: Burton Stahl MD     Consent:     Consent obtained:  Verbal    Consent given by:  Patient    Risks, benefits, and alternatives were discussed: yes      Risks discussed:  Infection, pain and poor wound healing  Anesthesia:     Anesthesia method:  None  Laceration details:     Location:  Toe    Toe location:  R big toe    Length (cm):  1.5  Pre-procedure details:     Preparation:  Patient was prepped and draped in usual sterile fashion  Exploration:     Imaging outcome: foreign body not noted      Wound exploration: entire depth of wound visualized      Wound extent: no fascia violation noted, no foreign bodies/material noted and no vascular damage noted      Contaminated: no    Treatment:     Area cleansed with:  Gabriela    Amount of cleaning:  Standard    Irrigation solution:  Sterile saline  Skin repair:     Repair method:  Tissue adhesive  Approximation:     Approximation:  Close  Repair type:     Repair type:  Simple  Post-procedure details:     Dressing:  Open (no dressing)    Procedure completion:  Tolerated well, no immediate complications      Labs Reviewed   POCT URINE PREGNANCY          Imaging Results          X-Ray Foot Complete Right (Final result)  Result time 06/16/22 16:46:27    Final result by Bert Lewis MD (06/16/22 16:46:27)                 Impression:      No evidence for acute fracture, bone destruction, or dislocation.    No radiopaque soft tissue foreign bodies evident.      Electronically signed  by: Bert Lewis MD  Date:    06/16/2022  Time:    16:46             Narrative:    EXAMINATION:  XR FOOT COMPLETE 3 VIEW RIGHT    CLINICAL HISTORY:  . Laceration without foreign body of unspecified toe without damage to nail, initial encounter    TECHNIQUE:  AP, lateral, and oblique views of the right foot were performed.    COMPARISON:  None    FINDINGS:  The bones are intact.  There is no evidence for acute fracture or bone destruction.  There is no evidence for dislocation.  There are mild degenerative changes at the 1st metatarsophalangeal joint.  No bony erosions are identified.  No radiopaque soft tissue foreign bodies are identified in this patient with history of laceration.  There is a calcaneal spur at the insertion of the plantar fascia.                                 Medications   Tdap vaccine injection 0.5 mL (0.5 mLs Intramuscular Given 6/16/22 1800)   ibuprofen tablet 600 mg (600 mg Oral Given 6/16/22 1701)     Medical Decision Making:   Initial Assessment:       47-year-old female with history of hypertension presents for evaluation of right 1st toe laceration that she sustained PTA from the metal edge of a door scraping it.  She had bleeding controlled with pressure, able to drive here right afterwards, no other injuries or complaints, was otherwise at normal baseline prior.  She is unsure of tetanus status, will update now.  On exam she has 1.5 cm superficial laceration to right 1st toe distal and lateral to the nail, with some exposed fat extruding through laceration but no active bleeding.  X-ray checked with no sign of fracture associated or retained foreign body.  After wound extensively cleaned, it is still closely approximated with no active bleeding, amenable to closure with skin glue.  This was done without complication, patient was advised on further wound care and return precautions for any signs of infection or other concerns.                        Clinical Impression:   Final  diagnoses:  [S91.119A] Toe laceration          ED Disposition Condition    Discharge Stable        ED Prescriptions     None        Follow-up Information     Follow up With Specialties Details Why Contact Info    [facility] - Emergency Dept Emergency Medicine Go to  As needed [address] [facility] [address]  [phone]           Burton Stahl MD  06/17/22 0959

## 2023-03-01 NOTE — HPI
46yo F with pmh HTN, remote hx seizure disorder, anemia, hx blood transfusion, with chief complaint 2-3d hx L sided frontal HA. HA intermittent, L frontal scalp/forehead, behind L eye. Pain sharp. Mild relief with ibuprofen. HA intermittent, nonradiating. No trauma. No visual disturbance. No fever. No hx glaucoma. Pt states HA similar to previous migraine-type headaches. Pt states has been feeling off-balance with walking, randomly, infrequently x 1mo. She admits to L sided weakness x approx 2w. Noncompliant with antihypertensives x 1-2w. No CP, no SOB, no fever.  Similar symptoms in 2019, negative CT head and MRI. No neurology f/u. No hx autoimmune dz.   
unk

## 2023-04-09 ENCOUNTER — HOSPITAL ENCOUNTER (EMERGENCY)
Facility: OTHER | Age: 48
Discharge: HOME | End: 2023-04-09
Attending: EMERGENCY MEDICINE
Payer: MEDICAID

## 2023-04-09 VITALS
HEART RATE: 69 BPM | HEIGHT: 62 IN | TEMPERATURE: 98 F | WEIGHT: 170 LBS | DIASTOLIC BLOOD PRESSURE: 113 MMHG | SYSTOLIC BLOOD PRESSURE: 185 MMHG | RESPIRATION RATE: 16 BRPM | BODY MASS INDEX: 31.28 KG/M2 | OXYGEN SATURATION: 100 %

## 2023-04-09 DIAGNOSIS — K04.7 DENTAL ABSCESS: Primary | ICD-10-CM

## 2023-04-09 DIAGNOSIS — K08.89 TOOTHACHE: ICD-10-CM

## 2023-04-09 PROCEDURE — 99284 EMERGENCY DEPT VISIT MOD MDM: CPT

## 2023-04-09 PROCEDURE — 25000003 PHARM REV CODE 250: Performed by: EMERGENCY MEDICINE

## 2023-04-09 RX ORDER — LOSARTAN POTASSIUM 50 MG/1
100 TABLET ORAL DAILY
Status: DISCONTINUED | OUTPATIENT
Start: 2023-04-09 | End: 2023-04-10 | Stop reason: HOSPADM

## 2023-04-09 RX ORDER — IBUPROFEN 600 MG/1
600 TABLET ORAL EVERY 6 HOURS PRN
Qty: 20 TABLET | Refills: 0 | Status: SHIPPED | OUTPATIENT
Start: 2023-04-09

## 2023-04-09 RX ORDER — AMOXICILLIN AND CLAVULANATE POTASSIUM 875; 125 MG/1; MG/1
1 TABLET, FILM COATED ORAL 2 TIMES DAILY
Qty: 14 TABLET | Refills: 0 | Status: SHIPPED | OUTPATIENT
Start: 2023-04-09 | End: 2024-01-14 | Stop reason: ALTCHOICE

## 2023-04-09 RX ORDER — AMOXICILLIN AND CLAVULANATE POTASSIUM 875; 125 MG/1; MG/1
1 TABLET, FILM COATED ORAL
Status: COMPLETED | OUTPATIENT
Start: 2023-04-09 | End: 2023-04-09

## 2023-04-09 RX ORDER — LOSARTAN POTASSIUM 100 MG/1
100 TABLET ORAL DAILY
Qty: 90 TABLET | Refills: 3 | Status: SHIPPED | OUTPATIENT
Start: 2023-04-09 | End: 2024-01-14 | Stop reason: SDUPTHER

## 2023-04-09 RX ORDER — LOSARTAN POTASSIUM 100 MG/1
100 TABLET ORAL DAILY
COMMUNITY
End: 2024-01-14 | Stop reason: SDUPTHER

## 2023-04-09 RX ORDER — AMLODIPINE BESYLATE 5 MG/1
5 TABLET ORAL
Status: DISCONTINUED | OUTPATIENT
Start: 2023-04-09 | End: 2023-04-09

## 2023-04-09 RX ORDER — LIDOCAINE HYDROCHLORIDE 20 MG/ML
SOLUTION OROPHARYNGEAL
Qty: 100 ML | Refills: 0 | Status: SHIPPED | OUTPATIENT
Start: 2023-04-09

## 2023-04-09 RX ORDER — LOSARTAN POTASSIUM 50 MG/1
50 TABLET ORAL DAILY
Status: DISCONTINUED | OUTPATIENT
Start: 2023-04-09 | End: 2023-04-09

## 2023-04-09 RX ORDER — ACETAMINOPHEN 500 MG
1000 TABLET ORAL EVERY 6 HOURS PRN
Qty: 50 TABLET | Refills: 0 | Status: SHIPPED | OUTPATIENT
Start: 2023-04-09 | End: 2024-01-14 | Stop reason: ALTCHOICE

## 2023-04-09 RX ORDER — ACETAMINOPHEN 500 MG
1000 TABLET ORAL
Status: COMPLETED | OUTPATIENT
Start: 2023-04-09 | End: 2023-04-09

## 2023-04-09 RX ADMIN — LOSARTAN POTASSIUM 100 MG: 50 TABLET, FILM COATED ORAL at 10:04

## 2023-04-09 RX ADMIN — AMOXICILLIN AND CLAVULANATE POTASSIUM 1 TABLET: 875; 125 TABLET, FILM COATED ORAL at 10:04

## 2023-04-09 RX ADMIN — ACETAMINOPHEN 1000 MG: 500 TABLET, FILM COATED ORAL at 10:04
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
## 2023-04-10 NOTE — ED NOTES
LOC: The patient is awake, alert, and oriented to self, place, time, and situation. Pt is calm and cooperative. Affect is appropriate.  Speech is appropriate and clear.     APPEARANCE: Patient resting comfortably in no acute distress.  Patient is clean and well groomed.    SKIN: The skin is warm and dry; color consistent with ethnicity.  Patient has normal skin turgor and moist mucus membranes.  Skin intact; no breakdown or bruising noted.     Oral: Pt reports right upper tooth pain and swelling. Swelling to the right cheek noted.    MUSCULOSKELETAL: Patient moving upper and lower extremities without difficulty; denies pain in the extremities or back.  Denies weakness.     RESPIRATORY: Airway is open and patent. Respirations spontaneous, even, easy, and non-labored.  Patient has a normal effort and rate.  No accessory muscle use noted. Denies cough.     CARDIAC:  Normal rate noted.  No peripheral edema noted. No complaints of chest pain.     ABDOMEN: Soft and non tender to palpation.  No distention noted. Pt denies abdominal pain; denies nausea, vomiting, diarrhea, or constipation.    NEUROLOGIC: Eyes open spontaneously.  Behavior appropriate to situation.  Follows commands; facial expression symmetrical.  Purposeful motor response noted; normal sensation in all extremities. Pt denies headache; denies lightheadedness or dizziness; denies visual disturbances; denies loss of balance; denies unilateral weakness.
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
## 2023-04-10 NOTE — DISCHARGE INSTRUCTIONS
Mrs. Yarbrough,    Thank you for letting me care for you today! It was nice meeting you, and I hope you feel better soon.   If you would like access to your chart and what was done today please utilize the [facility].   Please come back to [facility] for all of your future medical needs.    Our goal in the emergency department is to always give you outstanding care and exceptional service. You may receive a survey by mail or e-mail in the next week regarding your experience in our ED. We would greatly appreciate you completing and returning the survey. Your feedback provides us with a way to recognize our staff who give very good care and it helps us learn how to improve when your experience was below our aspiration of excellence.     Sincerely,    Cuauhtemoc Jones MD  Board Certified Emergency Physician    Pediatric Blood & Marrow Transplant: Epidermolysis Bullosa Outpatient Progress Note    Interval Events     BMT Transplant Date: BMT Txp: 8/3/2016 (180)    Murali presents to the [facility] clinic this morning for labs and follow up exam following a recent hospital discharge (1/16-1/27).  She presented to the clinic on 1/16 for her 6 month anniversary visit, post unrelated BMT at which time she was found to be cytopenic of unknown origin.  Upon hospital admission, multi organism bacteremia was detected along with norovirus detected in her stool studies.  A hemolysis work up was unremarkable with the exception to FRANCIS positivity, 1+.  Platelet antibodies were investigated, this study was concluded to be uninterruptable and will be repeated today.  Given the presence of bacteremia, her best line was removed on 1/25 and PICC line was placed.  Blood cultures were collected following replacement and have remained negative; antibiotics were discontinued on 1/27 at her time of discharge.      Her parents report that the weekend was largely unremarkable however she continues to feel poorly.  She is irritable and fussy, continuing to refuse most physical activity as well as solid foods.  She continues to receive 5-6 bottles of pediasure each day with some milk and juice supplemented as well.  She has remained febrile with a tmax of 99 over the weekend; she had a slight nose bleed that resolved without intervention however now has crusting in her nares (bilaterally) making her breathing loud, though not labored.  She has no nausea, emesis or recurrence of diarrhea.  She continues with skin breakdown on her upper thighs (secondary to itching with her hands) as well as on her back (secondary to itching/rubbing).  Doxepin was initiated while inpatient and has not shown significant relief.  Mom's concern is that her benadryl dosing was decreased significantly.  Prior to admission, Murali's benadryl dosing was up to 18.5mg TID PRN.   This was decreased to 10mg TID and just recently increased to 15mg TID. She continues with pruritis.    She's showing purpura on her lips that has been present since her return to [place].        Review of Systems     An otherwise extensive Review of Systems was performed and is otherwise unremarkable.    Medications:       Current outpatient prescriptions:      nystatin (MYCOSTATIN) ointment, Apply topically 2 times daily To peristomal site, Disp: 30 g, Rfl: 3     magic mouthwash suspension (diphenhydrAMINE 5 mg/5 mL, lidocaine 50 mg/5 mL, Maalox 2.5 g/5 mL , simethicone 6.65 mg/5 mL), Swish and swallow 2.5 mLs in mouth 3 times daily, Disp: 120 mL, Rfl: 3     diphenhydrAMINE (BENADRYL) 12.5 MG/5ML liquid, Take 6 mLs (15 mg) by mouth 3 times daily as needed for itching, Disp: , Rfl:      doxepin (SINEQUAN) 10 MG/ML (HIGH CONC) solution, Take 0.1 mLs (1 mg) by mouth At Bedtime, Disp: 3 mL, Rfl: 0     tacrolimus (PROGRAF - GENERIC EQUIVALENT) 1 mg/mL suspension, Follow taper calendar, Disp: 120 mL, Rfl: 2     mupirocin (BACTROBAN) 2 % ointment, Apply topically 2 times daily, Disp: 30 g, Rfl: 3     oxyCODONE (ROXICODONE) 5 MG/5ML solution, Take 5 mLs (5 mg) by mouth every 4 hours as needed for moderate to severe pain Take an additional 1 mg (1 mL) by mouth every 4 hours as needed for breakthrough pain, Disp: 210 mL, Rfl: 0     camphor-eucalyptus-menthol (VICKS VAPORUB) 4.73-1.2-2.6 % OINT, Apply 1 g topically daily as needed for cough, Disp: 50 g, Rfl: 0     sodium chloride (OCEAN) 0.65 % nasal spray, Spray 1 spray into both nostrils every hour as needed for congestion, Disp: 1 Bottle, Rfl: 1     heparin lock flush 10 UNIT/ML SOLN, 2-4 mLs by Intracatheter route every 24 hours, Disp: 1 vial, Rfl: 1     heparin lock flush 10 UNIT/ML SOLN, 2-4 mLs by Intracatheter route every hour as needed for other (, to lock CVC - Open Ended (Tunneled and Non-Tunneled) dormant lumen.), Disp: 1 vial, Rfl: 1     order for DME,  Supplies being ordered: Martín GT buttons 14 fr 1.5 cm Treatment Diagnosis: BMT patient, 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, Disp: 1 Device, Rfl: 11     acetaminophen (TYLENOL) 160 MG/5ML oral liquid, Take 5 mLs (160 mg) by mouth every 4 hours as needed for mild pain or fever, Disp: 100 mL, Rfl: 0     order for DME, Equipment being ordered:  TERESA-KEY gastrostomy tube button, 14 french x 1.5cm, replacement tube to have at home as a spare Extensions for BLANCA-KEY button Feeding supplies for g-tube, 60ml cath tip syringes Feeding pump Night time drip feeding 55ml/hour x 8 hours, formula of choice CHUX underpads 2 per day, Disp: 1 Month, Rfl: 11     order for DME, Equipment being ordered: gastrostomy tube supplies: 60 ml catheter tip syringes, 1 box 12 inch right angle extensions with med port for BLANCA-KEY g-tube button. 5 per month Feeding pump Feeding bags for pump, 30 per month, Disp: 1 Month, Rfl: 11    Physical Exam:     Vital Signs: /78 mmHg  Pulse 154  Temp(Src) 98.2  F (36.8  C) (Axillary)  Resp 32  Ht   Wt   SpO2 96%    GEN: Awake, fussy and seeming fatigued.  Resistant to exam as well as taking PO meds; cooperative in time with mom.     HEENT: Hair regrowth; scattered blisters throughout hairline, some scabbed over. Nares with dried blood bilaterally.  Lips with purpura and blistering.  Oral exam not done due to patient cooperation and risk of injury to mucosa.   CARD: Regular rhythm, tachycardic. No murmurs, rubs or gallops.    RESP:  No increased work of breathing, no stridor, crackles or wheezes.  Good aeration throughout; no coughing.   ABD: Soft, nontender, nondistended. Repaired stoma site covered, dressings CDI.   EXTREM: moves all extremities well. No edema.     SKIN: No hives nor rash appreciated at this time though torso covered with clothing and pt is resistant to removal.  Lower extremities entirely covered in dressings; hands in gloves with dried blood at left fingers.   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 She had an unremarkable transplant course and returned to her home in [place], shortly after her Day 100 visit.  Her most recent (day +180) engraftment studies reveal 100% donor engraftment of CD33/66b+ and 52% donor engraftment on CD3 in her blood with 27% donor cells in her tissue. She has no history of acute or chronic GvHD.  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   She recently admitted to Unit 4 from clinic on  as stated above, that admission revealed:   Noro virus positive in stool.   FRANCIS+ workup results show warm autoantibodies. Lab results not consistent with hemolysis.   Blood culture positive pseudomonas/delftia () and Staph Epidermidis ().    OR procedures: skin biopsies, GT stoma repair, CVC removal, and PICC placement.     She returns to clinic for follow up today and is again showing thrombocytopenia in the presence of a developing leukocytosis.  In addition, she demonstrates a continued electrolyte imbalance with Magnesium of 1.3.    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   - Day +28 VNTR: CD3 100% donor; CD33/66b+ 76% donor.    - Day +60 VNTR: CD3 99% donor; CD33/66b+ 32% donor.    - 10/20 VNTR: CD3 88% donor; CD33/66b+ 87% donor.   - 09/10 Tissue biopsy (performed for  rash) showing 22% donor cells.    - Day +60, 100 and 180 MSCs infused without complication.    - Day +100 skin engraftment studies showed 12 % donor engraftment with 100% donor engraftment of CD33/66b+ and 88% donor engraftment on CD3 in her blood.    - Day +180 skin engraftment studies show 27% donor engraftment with 100% donor engraftment of CD33/66b+ and 52% donor engraftment on CD3 in her blood.                                               # Risk for GVHD:  Given her new onset of rash, with unknown etiology, skin biopsy was performed on 1/20.  Pathology reports are not consistent with GvHD.    - Her tacrolimus taper was initiated as scheduled while admitted (~day 180). A taper calendar was provided to the family for guidance.  Routine levels are no longer required while on a taper.    - She is status post MMF thru day 30 and post-transplant Cytoxan on days +4 and +5                                                    Hematology:   # Cytopenia: Murali was transfusion independent at her Day 100 discharge however new cytopenias were revealed upon her 6 month anniversary visit.  - Goals remain, to transfuse for hemoglobin < 7 g/dL and for platelets < 20,000. GCSF prn ANC < 1.0.  She does require premedications of Benadryl and Tylenol for these transfusions.  During times of procedure, increase hemoglobin parameter to greater than 8g/dL  - Hemoloysis labs sent on 1/16 resulting FRANCIS + for warm autoantibodies. Her peripheral smear was not consistent with hemolysis nor were the remainder of her laboratory evaluations (LDH, haptoglobin normal, reticulocyte count elevated).              - Plt XM and Plt antibody screen revealed minimal antibody involvement suggesting that cross matched platelets would not beneficial at this time.  These studies are being repeated today (1/30).    - She received decadron (0.6 mg/kg QD) on 1/21 & 1/22 following which a slight improvement in her counts was appreciated.      Infectious  Disease:  # Bacteremia:  Blood culture positive with pseudomonas and delftia acidovorans (1/16), and  Staphylococcus epidermidis from the purple port on 1/23.                      - Received cefepime (1/16-1/27) for psuedomonas, delftia and Vancomycin (1/25-1/27) for Staph Epidermidis.   - Valentine line was ultimately removed on 1/25 and replaced with a PICC; blood cultures have since remained negative.  She continued for cefepime/vancomycin for 48 hours post CVC line removal/PICC placement and discontinued both, the morning of hospital discharge.   -WBC/ANC show elevation again today for which repeat blood cultures have been ordered; she remains afebrile, VSS.               # History of CMV Viremia: First detected 8/29 and treated with a 7 week course of Ganciclovir (discontinued early due to count suppression).     - Prophylactic Valtrex completed thru Day 100 upon discontinuation of the Ganciclovir.   - IgG levels intermittently followed, IVIG, administered for levels less than 400. 1/16 level 1210  - CMV detectable (<137) on 1/26. No intervention- recheck in process at this time (1/30)    # Past Infections:    - Jan'17: possible strep throat, treated with ppx amoxicillin.  Drug reaction developed for which antibiotic was discontinued.    - Dec' 16: (in [place]) MSSA, treated with nafcillin.    - 10/19: Enterococcus faecalis (blood), Chryseobacterium indologenes (treated w/ Levo and Linezolid thru 10/31)  - 08/17:  Granulicatella adiacens  - 08/02: Staph aureus (right hand), PCN resistant  - 07/18: Staph aureus (2 strains), Beta hemolytic strep group B, Acinetobacter baumannii complex   - Stool yeast surveillance culture: history of VRE,  Cathi Parapsillosis    # Increased Risk for Infections, secondary to chemotherapy:     - Donor CMV negative/Karina CMV + .  See CMV above.  - fungal prophylaxis  (Itraconazole) completed at Day 100.    - PJP PPx: Traditionally, Bactrim is utilized until patient is one year post  transplant.  Given the known side effect of marrow/count suppression, bactrim was held during her admission due to cytopenias of unknown etiology.  In replacement, she used inhaled pentamidine on 1/17.  I would advice repeating a dose of inhaled pentamidine, 2/17 and reassessing her CBC in March.  If counts have normalized, she may resume prophylactic bactrim.     FEN/Renal:  # Risk for malnutrition: She continues to maintain good PO intake with Pediasure through a bottle.   - Her Pediasure goals remain at 5-6 cans/day in addition to her juice and milk. She is no longer taking consistent volumes of table foods and her nutritional intake is solely Pediasure per parent.   - Her G tube was removed upon their return home to [place] as it was not being utilized for medication nor nutrition. See GI below for stoma details.    # Electrolyte Imbalance:   -HypoMg: received routine replacements while inpatient; level again low today (1.3) for which she'll receive replacement IV.     Pulmonology:  # Increased work of breathing (1/20).  VBG normal (1/21). Intermittent blow-by required post-MSC infusion and stable onroom air since noon 1/25.   - CXR (1/21) revealed perihilar opacities and viral picture. Azithromycin course completed 1/21-1/25.    - RSV and influenza rapid antigen negative; Resp viral PCR neg  - Decadron as above    Gastrointestinal:  # G-tube Stoma: Failure for natural closure status post G-tube removal in early December.  -1/25  Stoma repaired in OR by Peds Surg.  Concern for yeast draining from site for which Nystatin ointment was initiated BID to peristomal site for a 10 day course.          # Noro Virus: detected upon admission with new onset malodorous, loose stools.     - Stool cx: Norovirus + (1/16), VRE, Candida albicans/dubliniensis (1/18) susceptible to fluconazole, micafungin, and voriconazole. Asymptomatic- utilize for future reference.    - C.diff and remainder of enteric stool panel negative.    # Risk  for esophageal reflux: She demonstrates no evidence of reflux symptoms and receives 100% of her nutrition orally.  Parents report that they discontinued her prophylactic medication some time ago.        Dermatology:    # Rash/hives: had vesiculobollous eruptions upon 1/16 admission, started around day 1 post initiation of amoxicillin.   - TMC 0.1% cream bid and desonide 0.05% ointment to facial lesions bid, per Derm recs however family is not utilizing.   - Derm consult: Skin biopsies (1/20 and 1/25): DDx inclusive of autoimmune bullous reaction vs drug induced pemphigus.  Direct immunoflouresence (DIF) negative, however could be falsely negative.  Pemphigus panel and paraneoplastic pemphigus abx screen pending from 1/27  (sendout to [email or web address]      # Pruritis: continues to be quite significant.   - Benadryl and Doxepin remain available for pruritis with little relief.  Presently receiving Doxepin 1mg Q HS; Bendaryl 15mg Q 6 hrs  - will trial topical benadryl cream in effort to offer some relief; mom is placing gloves on hands at night as well, to reduce excoriations.    - Previous PO benadryl dosing was 18.5mg Q 6    Neurology:  # Pain: She continued on her oxycodone per home regimen, presently dosed at 5mg Q 4 hours.     -Presumed to have throat pain, g tube stoma pain and overall generalized pain secondary to wounds upon admission; some improvement noted at discharge though continues with pain secondary to underlying disease.     # Pruritis:  continues to be her primary problem.  She remains on Benadryl 10 mg TID   -1/25 initiated Doxepin @ HS, presently on 1mg QD.  Should not exceed 2 mg/day.       # Deconditioning: secondary to her underlying disease in combination with prolonged hospitalization though overall has been quite minimal, historically.  At the present time, she is refusing to walk, stand, crawl and climb for unknown reasons.   Parents report that she has been refusing most activity since mid December.   She had worked routinely with Physical Therapy while in [place] wit her primary admission and showed great progress in her milestone achievements.     Access: CVC removed, replaced with double lumen PICC 1/25     Disposition:   Please return to clinic on Wednesday and Friday of this week for repeat labs and possible platelet transfusion needs.   -blood cultures in process along with platelet antibody screen and CMV PRC.  Should cultures become positive, patient will admit to Unit 4 for IV antibiotic treatment.    I spent a total of 90 minutes face-to-face with Ronaldo Dennis during today s office visit. Over 50% of  this time was spent counseling the patient and/or coordinating care regarding clinical status. See note for details. I spent a total of 90 minutes of non-face-to-face time coordinating care.    Kayy York MS (Rusch), PA-C  Pediatric Blood and Marrow Transplant  [facility]  Pager [phone]

## 2023-08-14 ENCOUNTER — HOSPITAL ENCOUNTER (EMERGENCY)
Facility: OTHER | Age: 48
Discharge: HOME | End: 2023-08-14
Attending: EMERGENCY MEDICINE
Payer: MEDICAID

## 2023-08-14 VITALS
HEART RATE: 68 BPM | WEIGHT: 168 LBS | OXYGEN SATURATION: 98 % | RESPIRATION RATE: 20 BRPM | BODY MASS INDEX: 31.72 KG/M2 | DIASTOLIC BLOOD PRESSURE: 127 MMHG | SYSTOLIC BLOOD PRESSURE: 219 MMHG | TEMPERATURE: 99 F | HEIGHT: 61 IN
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
## 2023-08-14 DIAGNOSIS — R03.0 ELEVATED BLOOD PRESSURE READING: ICD-10-CM

## 2023-08-14 DIAGNOSIS — S00.03XA CONTUSION OF SCALP, INITIAL ENCOUNTER: ICD-10-CM

## 2023-08-14 DIAGNOSIS — I10 HYPERTENSION: ICD-10-CM

## 2023-08-14 PROCEDURE — 99283 EMERGENCY DEPT VISIT LOW MDM: CPT

## 2023-08-14 PROCEDURE — 25000003 PHARM REV CODE 250: Performed by: EMERGENCY MEDICINE

## 2023-08-14 PROCEDURE — 93010 EKG 12-LEAD: ICD-10-PCS | Mod: ,,, | Performed by: INTERNAL MEDICINE

## 2023-08-14 PROCEDURE — 93005 ELECTROCARDIOGRAM TRACING: CPT

## 2023-08-14 PROCEDURE — 93010 ELECTROCARDIOGRAM REPORT: CPT | Mod: ,,, | Performed by: INTERNAL MEDICINE

## 2023-08-14 RX ORDER — IBUPROFEN 600 MG/1
600 TABLET ORAL
Status: COMPLETED | OUTPATIENT
Start: 2023-08-14 | End: 2023-08-14

## 2023-08-14 RX ADMIN — IBUPROFEN 600 MG: 600 TABLET, FILM COATED ORAL at 02:08
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
## 2023-08-17 ENCOUNTER — PATIENT OUTREACH (OUTPATIENT)
Dept: EMERGENCY MEDICINE | Facility: OTHER | Age: 48
End: 2023-08-17
Payer: MEDICAID
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
## 2023-12-19 ENCOUNTER — HOSPITAL ENCOUNTER (EMERGENCY)
Facility: OTHER | Age: 48
Discharge: HOME | End: 2023-12-19
Attending: EMERGENCY MEDICINE
Payer: MEDICAID

## 2023-12-19 VITALS
TEMPERATURE: 98 F | WEIGHT: 170 LBS | RESPIRATION RATE: 18 BRPM | SYSTOLIC BLOOD PRESSURE: 224 MMHG | HEART RATE: 73 BPM | BODY MASS INDEX: 32.12 KG/M2 | DIASTOLIC BLOOD PRESSURE: 133 MMHG | OXYGEN SATURATION: 98 %

## 2023-12-19 DIAGNOSIS — S39.012A STRAIN OF LUMBAR REGION, INITIAL ENCOUNTER: ICD-10-CM
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
## 2023-12-19 LAB
B-HCG UR QL: NEGATIVE
CTP QC/QA: YES

## 2023-12-19 PROCEDURE — 96372 THER/PROPH/DIAG INJ SC/IM: CPT

## 2023-12-19 PROCEDURE — 81025 URINE PREGNANCY TEST: CPT | Performed by: NURSE PRACTITIONER

## 2023-12-19 PROCEDURE — 63600175 PHARM REV CODE 636 W HCPCS

## 2023-12-19 PROCEDURE — 99284 EMERGENCY DEPT VISIT MOD MDM: CPT

## 2023-12-19 PROCEDURE — 25000003 PHARM REV CODE 250

## 2023-12-19 RX ORDER — NAPROXEN 500 MG/1
500 TABLET ORAL 2 TIMES DAILY WITH MEALS
Qty: 30 TABLET | Refills: 0 | Status: SHIPPED | OUTPATIENT
Start: 2023-12-19

## 2023-12-19 RX ORDER — ORPHENADRINE CITRATE 30 MG/ML
30 INJECTION INTRAMUSCULAR; INTRAVENOUS
Status: COMPLETED | OUTPATIENT
Start: 2023-12-19 | End: 2023-12-19

## 2023-12-19 RX ORDER — LIDOCAINE 50 MG/G
2 PATCH TOPICAL ONCE
Status: DISCONTINUED | OUTPATIENT
Start: 2023-12-19 | End: 2023-12-19 | Stop reason: HOSPADM

## 2023-12-19 RX ORDER — KETOROLAC TROMETHAMINE 30 MG/ML
30 INJECTION, SOLUTION INTRAMUSCULAR; INTRAVENOUS
Status: COMPLETED | OUTPATIENT
Start: 2023-12-19 | End: 2023-12-19

## 2023-12-19 RX ORDER — METHOCARBAMOL 750 MG/1
1500 TABLET, FILM COATED ORAL 3 TIMES DAILY
Qty: 30 TABLET | Refills: 0 | Status: SHIPPED | OUTPATIENT
Start: 2023-12-19 | End: 2023-12-24

## 2023-12-19 RX ORDER — LIDOCAINE 50 MG/G
1 PATCH TOPICAL DAILY
Qty: 5 PATCH | Refills: 0 | Status: SHIPPED | OUTPATIENT
Start: 2023-12-19

## 2023-12-19 RX ADMIN — LIDOCAINE 2 PATCH: 50 PATCH CUTANEOUS at 01:12

## 2023-12-19 RX ADMIN — KETOROLAC TROMETHAMINE 30 MG: 30 INJECTION, SOLUTION INTRAMUSCULAR at 01:12

## 2023-12-19 RX ADMIN — ORPHENADRINE CITRATE 30 MG: 60 INJECTION INTRAMUSCULAR; INTRAVENOUS at 01:12
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
## 2023-12-21 ENCOUNTER — PATIENT OUTREACH (OUTPATIENT)
Dept: EMERGENCY MEDICINE | Facility: OTHER | Age: 48
End: 2023-12-21
Payer: MEDICAID

## 2024-01-13 ENCOUNTER — HOSPITAL ENCOUNTER (EMERGENCY)
Facility: OTHER | Age: 49
Discharge: HOME | End: 2024-01-13
Attending: EMERGENCY MEDICINE
Payer: MEDICAID

## 2024-01-13 VITALS
RESPIRATION RATE: 14 BRPM | HEIGHT: 61 IN | SYSTOLIC BLOOD PRESSURE: 166 MMHG | OXYGEN SATURATION: 97 % | BODY MASS INDEX: 32.1 KG/M2 | HEART RATE: 70 BPM | DIASTOLIC BLOOD PRESSURE: 92 MMHG | TEMPERATURE: 99 F | WEIGHT: 170 LBS

## 2024-01-13 VITALS
HEIGHT: 61 IN | OXYGEN SATURATION: 100 % | WEIGHT: 170 LBS | DIASTOLIC BLOOD PRESSURE: 108 MMHG | RESPIRATION RATE: 22 BRPM | BODY MASS INDEX: 32.1 KG/M2 | HEART RATE: 96 BPM | SYSTOLIC BLOOD PRESSURE: 189 MMHG

## 2024-01-13 DIAGNOSIS — Z91.148 NONCOMPLIANCE WITH MEDICATION REGIMEN: ICD-10-CM

## 2024-01-13 DIAGNOSIS — R04.0 RIGHT-SIDED EPISTAXIS: Primary | ICD-10-CM

## 2024-01-13 DIAGNOSIS — I10 PRIMARY HYPERTENSION: ICD-10-CM

## 2024-01-13 LAB
CTP QC/QA: YES
CTP QC/QA: YES
POC MOLECULAR INFLUENZA A AGN: NEGATIVE
POC MOLECULAR INFLUENZA B AGN: NEGATIVE
SARS-COV-2 RDRP RESP QL NAA+PROBE: NEGATIVE

## 2024-01-13 PROCEDURE — 96374 THER/PROPH/DIAG INJ IV PUSH: CPT

## 2024-01-13 PROCEDURE — 96375 TX/PRO/DX INJ NEW DRUG ADDON: CPT

## 2024-01-13 PROCEDURE — 63600175 PHARM REV CODE 636 W HCPCS

## 2024-01-13 PROCEDURE — 96376 TX/PRO/DX INJ SAME DRUG ADON: CPT

## 2024-01-13 PROCEDURE — 99284 EMERGENCY DEPT VISIT MOD MDM: CPT | Mod: 25

## 2024-01-13 PROCEDURE — 87635 SARS-COV-2 COVID-19 AMP PRB: CPT

## 2024-01-13 PROCEDURE — 99284 EMERGENCY DEPT VISIT MOD MDM: CPT | Mod: 25,27

## 2024-01-13 PROCEDURE — 25000003 PHARM REV CODE 250

## 2024-01-13 RX ORDER — ONDANSETRON 4 MG/1
4 TABLET, ORALLY DISINTEGRATING ORAL EVERY 8 HOURS PRN
Qty: 30 TABLET | Refills: 0 | Status: SHIPPED | OUTPATIENT
Start: 2024-01-13

## 2024-01-13 RX ORDER — LOSARTAN POTASSIUM 100 MG/1
100 TABLET ORAL DAILY
Qty: 90 TABLET | Refills: 3 | Status: SHIPPED | OUTPATIENT
Start: 2024-01-13 | End: 2025-01-12

## 2024-01-13 RX ORDER — CHLORTHALIDONE 25 MG/1
25 TABLET ORAL ONCE
Status: COMPLETED | OUTPATIENT
Start: 2024-01-13 | End: 2024-01-13

## 2024-01-13 RX ORDER — LORAZEPAM 2 MG/ML
0.5 INJECTION INTRAMUSCULAR
Status: COMPLETED | OUTPATIENT
Start: 2024-01-13 | End: 2024-01-13

## 2024-01-13 RX ORDER — LOSARTAN POTASSIUM 50 MG/1
100 TABLET ORAL ONCE
Status: DISCONTINUED | OUTPATIENT
Start: 2024-01-13 | End: 2024-01-13

## 2024-01-13 RX ORDER — ONDANSETRON HYDROCHLORIDE 2 MG/ML
4 INJECTION, SOLUTION INTRAVENOUS
Status: COMPLETED | OUTPATIENT
Start: 2024-01-13 | End: 2024-01-13

## 2024-01-13 RX ORDER — CHLORTHALIDONE 25 MG/1
25 TABLET ORAL DAILY
Qty: 30 TABLET | Refills: 3 | Status: SHIPPED | OUTPATIENT
Start: 2024-01-13 | End: 2024-05-12

## 2024-01-13 RX ORDER — OXYMETAZOLINE HCL 0.05 %
1 SPRAY, NON-AEROSOL (ML) NASAL
Status: COMPLETED | OUTPATIENT
Start: 2024-01-13 | End: 2024-01-13

## 2024-01-13 RX ORDER — KETOROLAC TROMETHAMINE 30 MG/ML
30 INJECTION, SOLUTION INTRAMUSCULAR; INTRAVENOUS
Status: COMPLETED | OUTPATIENT
Start: 2024-01-13 | End: 2024-01-13

## 2024-01-13 RX ORDER — LOSARTAN POTASSIUM 50 MG/1
100 TABLET ORAL ONCE
Status: COMPLETED | OUTPATIENT
Start: 2024-01-13 | End: 2024-01-13

## 2024-01-13 RX ORDER — HYDRALAZINE HYDROCHLORIDE 20 MG/ML
10 INJECTION INTRAMUSCULAR; INTRAVENOUS
Status: COMPLETED | OUTPATIENT
Start: 2024-01-13 | End: 2024-01-13

## 2024-01-13 RX ORDER — TRANEXAMIC ACID 100 MG/ML
500 INJECTION, SOLUTION INTRAVENOUS
Status: COMPLETED | OUTPATIENT
Start: 2024-01-13 | End: 2024-01-13

## 2024-01-13 RX ORDER — CHLORTHALIDONE 25 MG/1
25 TABLET ORAL ONCE
Status: DISCONTINUED | OUTPATIENT
Start: 2024-01-13 | End: 2024-01-13

## 2024-01-13 RX ADMIN — HYDRALAZINE HYDROCHLORIDE 10 MG: 20 INJECTION INTRAMUSCULAR; INTRAVENOUS at 11:01

## 2024-01-13 RX ADMIN — ONDANSETRON 4 MG: 2 INJECTION INTRAMUSCULAR; INTRAVENOUS at 07:01

## 2024-01-13 RX ADMIN — CHLORTHALIDONE 25 MG: 25 TABLET ORAL at 01:01

## 2024-01-13 RX ADMIN — LOSARTAN POTASSIUM 100 MG: 50 TABLET, FILM COATED ORAL at 01:01

## 2024-01-13 RX ADMIN — Medication 1 SPRAY: at 10:01

## 2024-01-13 RX ADMIN — LORAZEPAM 0.5 MG: 2 INJECTION INTRAMUSCULAR; INTRAVENOUS at 12:01

## 2024-01-13 RX ADMIN — HYDRALAZINE HYDROCHLORIDE 10 MG: 20 INJECTION INTRAMUSCULAR; INTRAVENOUS at 12:01

## 2024-01-13 RX ADMIN — KETOROLAC TROMETHAMINE 30 MG: 30 INJECTION, SOLUTION INTRAMUSCULAR; INTRAVENOUS at 07:01

## 2024-01-13 RX ADMIN — Medication 1 SPRAY: at 06:01

## 2024-01-13 RX ADMIN — TRANEXAMIC ACID 500 MG: 100 INJECTION, SOLUTION INTRAVENOUS at 11:01

## 2024-01-13 NOTE — ED NOTES
Pt to ED with nosebleeding approx. 30-40 min PTA, states this is the first time she has had a nosebleed. Denies trauma. Noted to be hypertensive on arrival to ED, pt admits to partial compliance with HTN medications. States bleeding started when she was bending over at the waist while mopping. No focal neuro deficits. Appears highly anxious, tearful. Pt given ice pack, more gauze, reassurance. Bleeding is scant at this time.

## 2024-01-13 NOTE — ED PROVIDER NOTES
Encounter Date: 1/13/2024       History     Chief Complaint   Patient presents with    Epistaxis     Nose bleed onset 20 min PTA. Blood noted in mouth as well. Denies injury/trauma. States she was mopping and began to feel hot when bleeding started. Did not take BP meds this am. Reports intermittently taking BP medications. /137. Denies cp/sob. Pt is anxious      This is a 48-year-old female with hypertension who presents to the ED with complaints of epistaxis that started approximately 20 minutes prior to arrival.  Patient also reports that she is coughing up the blood from her nose.  She denies hitting her head or any injury or trauma to the area.  No history of nosebleeds.  She has not on blood thinners.  Reports being in her kitchen when she started noticing blood coming out of her right nostril.  She has not been regularly compliant with her blood pressure medications.  Denies headache, vision changes, nausea or vomiting.  This is the extent of the patient's complaints at this time.    The history is provided by the patient.     Review of patient's allergies indicates:  No Known Allergies  Past Medical History:   Diagnosis Date    Anemia     Encounter for blood transfusion     History of seizure disorder     HTN (hypertension)     Seizures     last one when she was 20 years old     Past Surgical History:   Procedure Laterality Date    HERNIA REPAIR      TUBAL LIGATION       Family History   Problem Relation Age of Onset    Asthma Mother     Asthma Sister      Social History     Tobacco Use    Smoking status: Never    Smokeless tobacco: Never   Substance Use Topics    Alcohol use: Yes     Comment: occasional    Drug use: Yes     Types: Marijuana     Review of Systems  As per HPI above  Physical Exam     Initial Vitals [01/13/24 0954]   BP Pulse Resp Temp SpO2   (!) 223/137 107 (!) 22 -- 99 %      MAP       --         Physical Exam    Constitutional: She appears well-developed and well-nourished.  Non-toxic  appearance. She does not appear ill. She appears distressed.   Patient anxious and tearful on exam   HENT:   Head: Normocephalic and atraumatic.   Nose: Mucosal edema present. Epistaxis is observed.   Eyes: Conjunctivae are normal.   Neck: Neck supple.   Cardiovascular:  Normal rate and regular rhythm.           Pulmonary/Chest: Effort normal. No tachypnea. No respiratory distress.   Musculoskeletal:      Cervical back: Neck supple.     Neurological: She is alert and oriented to person, place, and time.   Skin: Skin is warm, dry and intact.   Psychiatric: Her speech is normal and behavior is normal. Her mood appears anxious.         ED Course   Epistaxis Mgmt    Date/Time: 1/13/2024 12:45 PM    Performed by: Naomi Arnold PA-C  Authorized by: Carlos Manuel Steve II, MD  Treatment site: right anterior  Repair method: anterior pack, oxymetazoline, nasal tampon and suction (tranexamic acid)  Post-procedure assessment: bleeding stopped  Treatment complexity: complex  Patient tolerance: Patient tolerated the procedure well with no immediate complications  Comments: Afrin used 1st without improvement of symptoms. After epistaxis worsened, suction was started prior to TXA soaked gauze which was packed into right naris.  First TXA soaked gauze immediately saturated with blood and was removed.  Direct pressure applied for some time prior to 2nd TXA soaked gauze which was effective at achieving hemostasis.  TXA soaked gauze packing applied for 15 minutes, after which patient forcefully blew her nose multiple times with out recurrence of symptoms        Labs Reviewed - No data to display       Imaging Results    None          Medications   oxymetazoline 0.05 % nasal spray 1 spray (1 spray Each Nostril Given 1/13/24 1023)   tranexamic acid injection Soln 500 mg (500 mg Nasal Given 1/13/24 1108)   hydrALAZINE injection 10 mg (10 mg Intravenous Given 1/13/24 1146)   LORazepam injection 0.5 mg (0.5 mg Intravenous Given  1/13/24 1218)   hydrALAZINE injection 10 mg (10 mg Intravenous Given 1/13/24 1220)   chlorthalidone tablet 25 mg (25 mg Oral Given 1/13/24 1315)   losartan tablet 100 mg (100 mg Oral Given 1/13/24 1315)     Medical Decision Making  Urgent evaluation of an afebrile 48-year-old female with epistaxis. Patient initially in the hallway with no active epistaxis, and noted to be hypertensive at 223/137.  She was extremely anxious and tearful on examination.  Patient's nose began bleeding again and patient was brought into a different room in order to treat her epistaxis which was performed as per procedure note.  Additionally, patient stayed hypertensive and was unable to tolerate p.o. medications, resulting in administration of IV hydralazine.  During the procedure, patient became increasingly anxious, requiring administration of Ativan.  Epistaxis achieved as per procedure note after administration of tranexamic acid soaked gauze and direct pressure.  She was able to blow her nose multiple times without recurrence of symptoms.  She had improvement in her hypertensive medications and was able to tolerate her home oral antihypertensives here.    Differential Diagnosis includes, but is not limited to:  Anterior epistaxis, posterior epistaxis, dry nasal mucosa, allergies, hypertension, coagulopathy, blood loss anemia, facial trauma, nasal tumor, AVM            Risk  OTC drugs.  Prescription drug management.               ED Course as of 01/13/24 2112   Sat Jan 13, 2024   1248 Pressure improving with IV hydralazine. Patient been without epistaxis for 30 minutes and has been able to blow her nose substantially multiple times without any recurrence of symptoms. Strict return precautions given.  Patient educated on humidification of air and use of Afrin for no more than 3 days.  Patient also restarted on her oral antihypertensive medications here in the ED and discharge with prescription for her losartan and chlorthalidone.   Instructed to follow-up with her primary care physician as well.  Patient educated on signs and symptoms to monitor for and when to return to ED. Patient verbalized understanding agrees with treatment plan. All questions and concerns addressed.  [RUBÉN]      ED Course User Index  [RUBÉN] Naomi Arnold PA-C                     Clinical Impression:  Final diagnoses:  [I10] Primary hypertension  [Z91.148] Noncompliance with medication regimen  [R04.0] Right-sided epistaxis (Primary)          ED Disposition Condition    Discharge Stable          ED Prescriptions       Medication Sig Dispense Start Date End Date Auth. Provider    losartan (COZAAR) 100 MG tablet Take 1 tablet (100 mg total) by mouth once daily. 90 tablet 1/13/2024 1/12/2025 Naomi Arnold PA-C    chlorthalidone (HYGROTEN) 25 MG Tab Take 1 tablet (25 mg total) by mouth once daily. 30 tablet 1/13/2024 5/12/2024 Naomi Arnold PA-C          Follow-up Information       Follow up With Specialties Details Why Contact Info    Radha Capone PA Physician Assistant Schedule an appointment as soon as possible for a visit  Primary care follow up [address] [facility] [address] [phone]      [facility] - [facility] Dept Emergency Medicine  If symptoms worsen [address] [facility] [address] [phone]             Naomi Arnold PA-C  01/13/24 2112

## 2024-01-13 NOTE — DISCHARGE INSTRUCTIONS
Please contact your primary care provider, MARCUS Jefferson, who you saw in August for follow up regarding your high blood pressure.     Continue taking losartan 100 mg and chlorthalidone 25 mg daily.  It is important that you take both of these medications everyday.  Please also see the attached instructions on the low-sodium diet that will help your blood pressure stay low.    You may use Afrin spray 2-3 times per day for 3 days, but do not take for longer than 3 days.  Additionally, humidified air will help her symptoms.  To do this, boil a pot of water and put your head over the water (careful not to touch your skin to the water) with a towel over your head to breathe in the steam.    If your nose begins bleeding significantly like it did earlier, please return to the ER.

## 2024-01-13 NOTE — Clinical Note
"Carol Joséleodan Yarbrough was seen and treated in our emergency department on 1/13/2024.  She may return to work on 01/15/2024.       If you have any questions or concerns, please don't hesitate to call.      Naomi Arnold PA-C"

## 2024-01-14 ENCOUNTER — HOSPITAL ENCOUNTER (EMERGENCY)
Facility: OTHER | Age: 49
Discharge: HOME | End: 2024-01-15
Attending: EMERGENCY MEDICINE
Payer: MEDICAID

## 2024-01-14 DIAGNOSIS — R04.0 EPISTAXIS: Primary | ICD-10-CM

## 2024-01-14 LAB
FIO2: 21
HCO3 UR-SCNC: 23.6 MMOL/L (ref 24–28)
HCT VFR BLD CALC: 34 %PCV (ref 36–54)
HGB BLD-MCNC: 12 G/DL
PCO2 BLDA: 44.2 MMHG (ref 35–45)
PH SMN: 7.34 [PH] (ref 7.35–7.45)
PO2 BLDA: 27 MMHG (ref 40–60)
POC BE: -2 MMOL/L
POC IONIZED CALCIUM: 1.3 MMOL/L (ref 1.06–1.42)
POC PCO2 TEMP: 44.2 MMHG
POC PH TEMP: 7.34
POC PO2 TEMP: 27 MMHG
POC SATURATED O2: 47 % (ref 95–100)
POC TCO2: 25 MMOL/L (ref 24–29)
POC TEMPERATURE: ABNORMAL
POTASSIUM BLD-SCNC: 3.5 MMOL/L (ref 3.5–5.1)
SAMPLE: ABNORMAL
SODIUM BLD-SCNC: 141 MMOL/L (ref 136–145)

## 2024-01-14 PROCEDURE — 30901 CONTROL OF NOSEBLEED: CPT | Mod: RT

## 2024-01-14 PROCEDURE — 25000003 PHARM REV CODE 250: Performed by: EMERGENCY MEDICINE

## 2024-01-14 PROCEDURE — 99284 EMERGENCY DEPT VISIT MOD MDM: CPT | Mod: 25

## 2024-01-14 PROCEDURE — 63600175 PHARM REV CODE 636 W HCPCS: Performed by: EMERGENCY MEDICINE

## 2024-01-14 PROCEDURE — 96374 THER/PROPH/DIAG INJ IV PUSH: CPT | Mod: 59

## 2024-01-14 PROCEDURE — 82803 BLOOD GASES ANY COMBINATION: CPT

## 2024-01-14 PROCEDURE — 99900035 HC TECH TIME PER 15 MIN (STAT)

## 2024-01-14 RX ORDER — LIDOCAINE HYDROCHLORIDE AND EPINEPHRINE 20; 10 MG/ML; UG/ML
1 INJECTION, SOLUTION INFILTRATION; PERINEURAL ONCE
Status: COMPLETED | OUTPATIENT
Start: 2024-01-14 | End: 2024-01-14

## 2024-01-14 RX ORDER — ONDANSETRON 8 MG/1
8 TABLET, ORALLY DISINTEGRATING ORAL
Status: DISCONTINUED | OUTPATIENT
Start: 2024-01-14 | End: 2024-01-14

## 2024-01-14 RX ORDER — ACETAMINOPHEN 325 MG/1
650 TABLET ORAL EVERY 6 HOURS PRN
Qty: 30 TABLET | Refills: 0 | Status: SHIPPED | OUTPATIENT
Start: 2024-01-14

## 2024-01-14 RX ORDER — HYDROCODONE BITARTRATE AND ACETAMINOPHEN 5; 325 MG/1; MG/1
1 TABLET ORAL
Status: COMPLETED | OUTPATIENT
Start: 2024-01-14 | End: 2024-01-14

## 2024-01-14 RX ORDER — OXYMETAZOLINE HCL 0.05 %
1 SPRAY, NON-AEROSOL (ML) NASAL
Status: COMPLETED | OUTPATIENT
Start: 2024-01-14 | End: 2024-01-14

## 2024-01-14 RX ORDER — HYDROCODONE BITARTRATE AND ACETAMINOPHEN 5; 325 MG/1; MG/1
1 TABLET ORAL EVERY 4 HOURS PRN
Qty: 8 TABLET | Refills: 0 | Status: SHIPPED | OUTPATIENT
Start: 2024-01-14 | End: 2024-01-17

## 2024-01-14 RX ORDER — ONDANSETRON HYDROCHLORIDE 2 MG/ML
8 INJECTION, SOLUTION INTRAVENOUS
Status: COMPLETED | OUTPATIENT
Start: 2024-01-14 | End: 2024-01-14

## 2024-01-14 RX ADMIN — HYDROCODONE BITARTRATE AND ACETAMINOPHEN 1 TABLET: 5; 325 TABLET ORAL at 11:01

## 2024-01-14 RX ADMIN — LIDOCAINE HYDROCHLORIDE,EPINEPHRINE BITARTRATE 1 ML: 20; .01 INJECTION, SOLUTION INFILTRATION; PERINEURAL at 10:01

## 2024-01-14 RX ADMIN — Medication 1 SPRAY: at 09:01

## 2024-01-14 RX ADMIN — ONDANSETRON 8 MG: 2 INJECTION INTRAMUSCULAR; INTRAVENOUS at 10:01

## 2024-01-14 NOTE — DISCHARGE INSTRUCTIONS
You may take Tylenol 1000 mg every 5 hours for headache    I have sent in Zofran for nausea and vomiting to your pharmacy for you to  tomorrow morning.    You may use the Afrin nasal spray one time again tonight.  You can then use it 2-3 times tomorrow and the day after, but do not use it more than that.    Remember to continue taking your blood pressure medications.

## 2024-01-14 NOTE — ED NOTES
Pt resting w/ eyes closed, easily arousable and in no obvious distress. Pt is A & O x 3, denies SOB, fever, chills and N/V/D. No respiratory distress observed, respirations are even and unlabored. Skin is warm, dry and pink. VSS. Will continue to monitor closely.  
Pt states her nausea is gone.  
Rec'd report from Radha ORR RN. Pt is in semi-fowlers position in bed w/o complaint at this time. Pt is A & O x 3, denies SOB, respiratory distress and respirations are even and unlabored. Skin is warm and dry w/ pink mucosa. Bed is locked and in the low position w/ the side rails up and locked for safety. Call bell and visitor @ BS. Will continue to monitor closely.  
Tonsillitis

## 2024-01-14 NOTE — ED PROVIDER NOTES
Encounter Date: 1/13/2024       History     Chief Complaint   Patient presents with    Epistaxis     Pt was discharged this afternoon for the same complaint. States nose bleeding stopped prior to discharge but threw up at home and symptoms started again. Pt reports HAAS     This is a 48-year-old female who presents to the ED with complaints of epistaxis since prior to arrival.  Patient seen in this ED and discharged 2 hours ago with same complaints. At her previous visit, epistaxis resolved after afrin and tranexamic acid and was discharged. Patient states her symptoms started about an hour prior to arrival after she started vomiting. Reports some nausea and vomiting, coughing up dried blood, which started her right nostril bleeding again.  Patient also complaining of headache and generalized weakness.  She did not try taking the Afrin she was discharged with.  States that her nose stopped bleeding while in the waiting room. This is the extent of the patient's complaints at this time.    The history is provided by the patient and medical records.     Review of patient's allergies indicates:  No Known Allergies  Past Medical History:   Diagnosis Date    Anemia     Encounter for blood transfusion     History of seizure disorder     HTN (hypertension)     Seizures     last one when she was 20 years old     Past Surgical History:   Procedure Laterality Date    HERNIA REPAIR      TUBAL LIGATION       Family History   Problem Relation Age of Onset    Asthma Mother     Asthma Sister      Social History     Tobacco Use    Smoking status: Never    Smokeless tobacco: Never   Substance Use Topics    Alcohol use: Yes     Comment: occasional    Drug use: Yes     Types: Marijuana     Review of Systems  As per HPI above  Physical Exam     Initial Vitals [01/13/24 1627]   BP Pulse Resp Temp SpO2   (!) 176/117 102 18 97.9 °F (36.6 °C) 100 %      MAP       --         Physical Exam    Constitutional: She appears well-developed and  well-nourished.  Non-toxic appearance. She does not appear ill. No distress.   HENT:   Head: Normocephalic and atraumatic.   Nose: Mucosal edema present. No epistaxis.   Dried blood around external nares and nasal cavity with mucosal edema.  No active epistaxis noted.   Eyes: Conjunctivae are normal.   Neck: Neck supple.   Cardiovascular:  Normal rate and regular rhythm.           Pulmonary/Chest: Effort normal. No tachypnea. No respiratory distress.   Musculoskeletal:      Cervical back: Neck supple.     Neurological: She is alert and oriented to person, place, and time.   Skin: Skin is warm, dry and intact.   Psychiatric: Her speech is normal and behavior is normal. Her mood appears anxious.         ED Course   Procedures  Labs Reviewed   SARS-COV-2 RDRP GENE   POCT INFLUENZA A/B MOLECULAR          Imaging Results    None          Medications   oxymetazoline 0.05 % nasal spray 1 spray (1 spray Each Nostril Given 1/13/24 1801)   ketorolac injection 30 mg (30 mg Intravenous Given 1/13/24 1906)   ondansetron injection 4 mg (4 mg Intravenous Given 1/13/24 1906)     Medical Decision Making  Urgent evaluation of an afebrile 48-year-old female with complaints of epistaxis that resolved prior to my evaluation.  Patient known to me from earlier today during her previous visit for epistaxis which resolved after use of TXA. No active epistaxis at this time. Patient was given Afrin spray again. Had shared decision-making with patient regarding next steps as options include intranasal medical grade cocaine and rhino rocket to decrease chance of recurrence. She was informed of the need for close ENT follow up and discomfort of the procedure if electing for rhino rocket placement. Patient declines rhino rocket at this time. As patient is without active epistaxis, I think this is reasonable. Patient educated that she may experience some additional oozing of blood from her nostril and to use Afrin and apply direct pressure should  this happen again at home and to return if bleeding does not stop after those initial conservative measures at home. Patient remains anxious as she was during her earlier visit regarding her symptoms. Her hypertension is also improving.  She was swabbed for COVID symptoms of headache and generalized weakness. COVID and flu swab negative.  Patient with improved symptoms after administration of Toradol and Zofran.  She was instructed to make sure that she continues taking her antihypertensive medications as prescribed.  She was reminded of humidification of air and symptomatic management at home.  Patient educated on signs and symptoms to monitor for and when to return to ED. Patient verbalized understanding agrees with treatment plan. All questions and concerns addressed.     Amount and/or Complexity of Data Reviewed  Labs: ordered.    Risk  OTC drugs.  Prescription drug management.                                Clinical Impression:  Final diagnoses:  [R04.0] Right-sided epistaxis (Primary)          ED Disposition Condition    Discharge Stable          ED Prescriptions       Medication Sig Dispense Start Date End Date Auth. Provider    ondansetron (ZOFRAN-ODT) 4 MG TbDL Take 1 tablet (4 mg total) by mouth every 8 (eight) hours as needed (for nausea). 30 tablet 1/13/2024 -- Naomi Arnold PA-C          Follow-up Information       Follow up With Specialties Details Why Contact Info    [facility] Emergency Dept Emergency Medicine  [facility] symptoms worsen [address] [facility] [id number] [phone]             Naomi Arnold PA-C  01/13/24 9761

## 2024-01-15 VITALS
SYSTOLIC BLOOD PRESSURE: 149 MMHG | TEMPERATURE: 98 F | DIASTOLIC BLOOD PRESSURE: 94 MMHG | OXYGEN SATURATION: 98 % | WEIGHT: 170 LBS | BODY MASS INDEX: 32.12 KG/M2 | RESPIRATION RATE: 17 BRPM | HEART RATE: 103 BPM

## 2024-01-15 NOTE — ED NOTES
Medications given topically in nares by MD; NOT via IV infusion.    MD at bedside for rhinorocket placement. Pt tolerated fairly. Son remains at bedside.

## 2024-01-15 NOTE — DISCHARGE INSTRUCTIONS

## 2024-01-15 NOTE — ED PROVIDER NOTES
"  Source of History:  Medical record, patient  Independent history obtained from : son    Chief complaint:  Per triage note: "Epistaxis (Pt c/o nose bleed that started 30 min PTA. Seen here twice yesterday for same. Received Afrin and TXA first visit. Second visit bleeding spontaneously resolved, pt declined rhino rocket. Pt has bowl of blood that she has lost from nose bleed in the past 30 min. )  "    HPI:    Patient presents for evaluation of recurrent epistaxis which started about 30 minutes prior to arrival.  Patient had heavy bleeding despite compliance with medications, nasal bridge pressure.  Blood is coming out of both nostrils and down her throat.  She denies any associated chest pain, lightheadedness, dyspnea.  Patient was seen twice in the past 2 days for recurrent epistaxis.  She was offered rhino rocket yesterday but declined after control using TXA and afrin.       ROS:   See HPI for pertinent review of systems        Review of patient's allergies indicates:  No Known Allergies    PMH:  As per HPI and below:  Past Medical History:   Diagnosis Date    Anemia     Encounter for blood transfusion     History of seizure disorder     HTN (hypertension)     Seizures     last one when she was 20 years old       Past Surgical History:   Procedure Laterality Date    HERNIA REPAIR      TUBAL LIGATION         Social History     Tobacco Use    Smoking status: Never    Smokeless tobacco: Never   Substance Use Topics    Alcohol use: Yes     Comment: occasional    Drug use: Yes     Types: Marijuana       Physical Exam:      Nursing note and vitals reviewed.  BP (!) 148/95 (BP Location: Left arm, Patient Position: Sitting)   Pulse 103   Temp 98.3 °F (36.8 °C) (Oral)   Resp 17   Wt 77.1 kg (170 lb)   SpO2 98%   BMI 32.12 kg/m²     Constitutional: AAOx3. Well appearing. Anxious, uncomfortable appearing.   Eyes: EOMI. No discharge. Anicteric.  HENT:   Initially mild persistent epistaxis on right. On reassessment, " pt had stream of blood pouring out of both nares, some blood coming from right tear duct  Mouth/Throat:    Neck: Normal range of motion. Neck supple.    Cardiovascular: Normal rate  Pulmonary/Chest: No respiratory distress.   Abdominal: No distension   Musculoskeletal: Normal range of motion.   Neurological: GCS15. Alert and oriented to person, place, and time. No gross cranial nerve II-XII, focal strength, or light touch deficit. Steady gait.   Skin: Skin is warm and dry.   Psychiatric: Behavior is normal. Judgment normal.      Medical Decision Making / Independent Interpretations / External Records Reviewed:      Patient is a 48-year-old female with history of hypertension, history of seizure, history of anemia who presents for evaluation of recurrent epistaxis.  This is the patient's third visit in 2 days for this complaint.  Yesterday, patient had to rounds of treatment with TXA and oxymetazoline which were successful.  Patient was off her rhino rocket placement yesterday given high likelihood of recurrence, but she declined.  She denies any recent nasal trauma including nose picking.  She denies any recent URI.  She denies any other bleeding.  She denies any other associated symptoms today.    On exam patient very anxious appearing, spitting into emesis bag with mild right-sided epistaxis initially.  On reassessment, patient had blood streaming out of both nostrils, was spitting up blood and clot, had blood also coming out of her right tear duct.  Given the history of multiple recurrences with appropriate medical treatment, I placed nasal tampon as documented.  The initial differential also included acute blood loss anemia.    I independently reviewed and interpreted labs which are notable for normal estimated Hb based on POC sample.  The differential also included anterior versus posterior epistaxis.  As the patient's epistaxis stopped after anterior nasal tampon placement, I doubt posterior epistaxis.    ED  Course as of 01/15/24 0606   Sun Jan 14, 2024   2212 Procedure:   Epistaxis Mgmt  Performed by: Ranulfo Cavazos MD  Authorized by: Ranulfo Cavazos MD   Indication: Epistaxis  Treatment site: right anterior  Repair method: Nasal bridge pressure, oxymetazoline, lidocaine with 2% epi, TXA, Rhinorocket nasal tampon  Post-procedure assessment: bleeding stopped  Treatment complexity: simple  Patient tolerance: Patient tolerated the procedure well with no immediate complications   [RC]   8579 On reassessment, patient complained of significant discomfort, foreign body sensation at the back of her throat, continued to appear quite anxious.  She has no obvious continued bleeding, and denied sensation of continued bleeding.  Patient was given significant comfort measures.  Given her continued discomfort, we will give dose of Norco here prior to discharge.  I contacted emergency department  to facilitate ENT follow-up in 24-72 hours.   Antibiotics were considered, but not indicated at this time.  --  I discussed with pt and/or guardian/caretaker that this evaluation in the ED does not suggest any emergent or life threatening medical condition requiring admission or further immediate intervention or diagnostics. Regardless, an unremarkable evaluation in the ED does not preclude the development or presence of a serious or life threatening condition. Pt was instructed to return for any worsening, new, changed, or concerning symptoms.     I had a detailed discussion with patient and/or guardian/caretaker regarding findings, plan, return precautions, importance of medication adherence, need to follow-up with a PCP and specialist. All questions answered.     Note was created using voice recognition software. It may have occasional typographical errors not identified and edited despite initial review prior to signing.   [RC]   Mon Brian 15, 2024   0001 Rhino rocket required an additional adjustment due to discomfort and  displacement.  [RC]      ED Course User Index  [RC] Ranulfo Cavazos MD       --  I decided to obtain the patient's medical records. I reviewed patient's prior external notes / results: immunization records.   --  Additional Medical Decision Making: Prescription drug management and Hospitalization or escalation of care considered    Medications   tranexamic acid (CYKLOKAPRON) 1,000 mg in sodium chloride 0.9 % 100 mL IVPB (MB+) ( Intravenous No Dose/Rate Change 1/14/24 2205)   HYDROcodone-acetaminophen 5-325 mg per tablet 1 tablet (has no administration in time range)   oxymetazoline 0.05 % nasal spray 1 spray (1 spray Each Nostril Given 1/14/24 2147)   LIDOcaine 2%/EPINEPHrine 1:100,000 injection 1 mL (1 mL Other Given by Provider 1/14/24 2205)   ondansetron injection 8 mg (8 mg Intravenous Given 1/14/24 2232)              No future appointments.     Diagnostic Impression:    1. Epistaxis         ED Disposition Condition    Discharge Good          ED Prescriptions       Medication Sig Dispense Start Date End Date Auth. Provider    HYDROcodone-acetaminophen (NORCO) 5-325 mg per tablet Take 1 tablet by mouth every 4 (four) hours as needed (severe pain not improved by other measures.). 8 tablet 1/14/2024 1/17/2024 Ranulfo Cavazos MD    acetaminophen (TYLENOL) 325 MG tablet Take 2 tablets (650 mg total) by mouth every 6 (six) hours as needed for Pain or Temperature greater than (100.3). 30 tablet 1/14/2024 -- Ranulfo Cavazos MD          Follow-up Information       Follow up With Specialties Details Why Contact [facility] ENT Otolaryngology In 3 days For recheck with specialist [address] [facility] [address] [phone]               Ranulfo Cavazos MD  01/14/24 7800       Ranulfo Cavazos MD  01/15/24 9732

## 2024-01-16 NOTE — PLAN OF CARE
"Janet faxed a referral for this patient to [facility] ENT.        Your fax has been successfully sent to [phone] at [phone].  ------------------------------------------------------------  From: [id number]  ------------------------------------------------------------  1/16/2024 8:52:47 AM Transmission Record          Sent to [phone] with remote ID "[email or web address]            "          Result: (0/339;0/0) Success          Page record: 1 - 4          Elapsed time: 01:34 on channel 45   "

## 2024-11-11 ENCOUNTER — HOSPITAL ENCOUNTER (EMERGENCY)
Facility: OTHER | Age: 49
Discharge: HOME | End: 2024-11-11
Attending: EMERGENCY MEDICINE
Payer: COMMERCIAL

## 2024-11-11 VITALS
DIASTOLIC BLOOD PRESSURE: 78 MMHG | BODY MASS INDEX: 39.27 KG/M2 | TEMPERATURE: 98 F | WEIGHT: 208 LBS | HEART RATE: 70 BPM | HEIGHT: 61 IN | OXYGEN SATURATION: 99 % | SYSTOLIC BLOOD PRESSURE: 139 MMHG | RESPIRATION RATE: 17 BRPM
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
## 2024-11-11 DIAGNOSIS — R07.81 RIB PAIN ON LEFT SIDE: ICD-10-CM

## 2024-11-11 LAB
B-HCG UR QL: NEGATIVE
CTP QC/QA: YES

## 2024-11-11 PROCEDURE — 63600175 PHARM REV CODE 636 W HCPCS: Performed by: EMERGENCY MEDICINE

## 2024-11-11 PROCEDURE — 96372 THER/PROPH/DIAG INJ SC/IM: CPT | Performed by: EMERGENCY MEDICINE

## 2024-11-11 PROCEDURE — 99284 EMERGENCY DEPT VISIT MOD MDM: CPT | Mod: 25

## 2024-11-11 PROCEDURE — 81025 URINE PREGNANCY TEST: CPT | Performed by: EMERGENCY MEDICINE

## 2024-11-11 RX ORDER — KETOROLAC TROMETHAMINE 30 MG/ML
15 INJECTION, SOLUTION INTRAMUSCULAR; INTRAVENOUS
Status: COMPLETED | OUTPATIENT
Start: 2024-11-11 | End: 2024-11-11

## 2024-11-11 RX ADMIN — KETOROLAC TROMETHAMINE 15 MG: 30 INJECTION, SOLUTION INTRAMUSCULAR; INTRAVENOUS at 09:11

## 2024-11-11 NOTE — ED TRIAGE NOTES
Pt presents to the ED with c/o abd pain after a unwitnessed fall yesterday. Pt reports falling on her abd, denies head injury. Pt AAOx3, NAD noted.

## 2024-11-11 NOTE — ED PROVIDER NOTES
Encounter Date: 11/11/2024    SCRIBE #1 NOTE: I, Maribell Awanbassam, am scribing for, and in the presence of,  Polo Castellanos MD.       History     Chief Complaint   Patient presents with    Abdominal Pain     Pt states that she had a fall landing on her abdomen on Friday and now has abdominal pain no nausea or vomiting.      50 y/o F with a PMHx of HTN, seizures, and thyroid problems presents to the ED for evaluation of left rib pain s/p a fall x3 days. The pt states that she fell forward on a tile floor and landed on her left ribs. Her symptoms worsen with laughing and coughing. Has not taken any medications. Denies SOB. Reports more rib pain and not really abdominal pain. Denies any hematuria, hemoptysis or hematemesis. She denies any LOC, nausea, vomiting. Denies any bruising, cuts or lacerations    The history is provided by the patient. No  was used.     Review of patient's allergies indicates:  No Known Allergies  Past Medical History:   Diagnosis Date    Anemia     Encounter for blood transfusion     History of seizure disorder     HTN (hypertension)     Seizures     last one when she was 20 years old     Past Surgical History:   Procedure Laterality Date    HERNIA REPAIR      TUBAL LIGATION       Family History   Problem Relation Name Age of Onset    Asthma Mother      Asthma Sister       Social History     Tobacco Use    Smoking status: Never    Smokeless tobacco: Never   Substance Use Topics    Alcohol use: Yes     Comment: occasional    Drug use: Yes     Types: Marijuana     Review of Systems   Constitutional:  Negative for activity change, diaphoresis and fever.   HENT:  Negative for ear pain, rhinorrhea, sore throat and trouble swallowing.    Eyes:  Negative for pain and visual disturbance.   Respiratory:  Negative for cough, shortness of breath and stridor.    Cardiovascular:  Negative for chest pain.   Gastrointestinal:  Negative for abdominal pain, blood in stool, diarrhea, nausea  and vomiting.   Genitourinary:  Negative for dysuria, hematuria, vaginal bleeding and vaginal discharge.   Musculoskeletal:  Positive for myalgias (left rib pain). Negative for gait problem.   Skin:  Negative for rash and wound.   Neurological:  Negative for seizures, syncope and headaches.   Psychiatric/Behavioral:  Negative for hallucinations and suicidal ideas.        Physical Exam     Initial Vitals   BP Pulse Resp Temp SpO2   11/11/24 0820 11/11/24 0820 11/11/24 0820 11/11/24 1009 11/11/24 0820   (!) 160/85 85 18 98.1 °F (36.7 °C) 99 %      MAP       --                Physical Exam    Nursing note and vitals reviewed.  Constitutional: She appears well-developed. She is not diaphoretic. No distress.   HENT:   Head: Normocephalic and atraumatic.   Right Ear: External ear normal.   Left Ear: External ear normal.   Nose: Nose normal. Mouth/Throat: Oropharynx is clear and moist.   No signs of basilar skull fx   Eyes: EOM are normal. No scleral icterus.   Neck: Neck supple.   Normal range of motion.  Cardiovascular:  Normal rate, regular rhythm, normal heart sounds and intact distal pulses.     Exam reveals no gallop and no friction rub.       No murmur heard.  Pulmonary/Chest: Breath sounds normal. No stridor. No respiratory distress. She has no wheezes. She has no rhonchi. She has no rales.   Abdominal: Abdomen is soft. Bowel sounds are normal. She exhibits no distension. There is no abdominal tenderness. There is no rebound and no guarding.   Musculoskeletal:         General: Normal range of motion.      Cervical back: Normal range of motion and neck supple. No deformity or tenderness.      Thoracic back: No deformity or tenderness.      Lumbar back: No deformity or tenderness.      Comments: Tenderness to the left lateral anterior lower ribs. No step off, swelling, bruising, or crepitus.     Neurological: She is alert and oriented to person, place, and time. She has normal strength. No cranial nerve deficit or  sensory deficit.   Skin: Skin is warm and dry. Capillary refill takes less than 2 seconds. No ecchymosis and no rash noted. No erythema.   No crepitus, bruising, cuts or external signs of trauma   Psychiatric: She has a normal mood and affect.         ED Course   Procedures  Labs Reviewed   POCT URINE PREGNANCY - Normal       Result Value    POC Preg Test, Ur Negative       Acceptable Yes            Imaging Results              X-Ray Chest PA And Lateral (Final result)  Result time 11/11/24 09:34:50      Final result by Megha Hollins MD (11/11/24 09:34:50)                   Impression:      No acute cardiopulmonary process seen.    No acute left rib fracture seen.      Electronically signed by: Megha Hollins  Date:    11/11/2024  Time:    09:34               Narrative:    EXAMINATION:  XR CHEST PA AND LATERAL; XR RIBS 2 VIEW LEFT    CLINICAL HISTORY:  Unspecified fall, initial encounter; Pleurodynia    TECHNIQUE:  PA and lateral views of the chest were performed.  Two views of the left ribs also obtained.    COMPARISON:  02/15/2022    FINDINGS:  Lungs are well expanded.  No acute consolidation, pleural effusion, or pneumothorax seen.    Cardiac silhouette is normal in size.    No left rib fracture identified.                                       X-Ray Ribs 2 View Left (Final result)  Result time 11/11/24 09:34:50      Final result by Megha Hollins MD (11/11/24 09:34:50)                   Impression:      No acute cardiopulmonary process seen.    No acute left rib fracture seen.      Electronically signed by: Megha Hollins  Date:    11/11/2024  Time:    09:34               Narrative:    EXAMINATION:  XR CHEST PA AND LATERAL; XR RIBS 2 VIEW LEFT    CLINICAL HISTORY:  Unspecified fall, initial encounter; Pleurodynia    TECHNIQUE:  PA and lateral views of the chest were performed.  Two views of the left ribs also obtained.    COMPARISON:  02/15/2022    FINDINGS:  Lungs are well expanded.   No acute consolidation, pleural effusion, or pneumothorax seen.    Cardiac silhouette is normal in size.    No left rib fracture identified.                                       Medications   ketorolac injection 15 mg (15 mg Intramuscular Given 11/11/24 0927)     Medical Decision Making  48 yo F presenting after a fall that occurred 3 days ago with left rib pain.  The mechanism of injury was a mechanical ground level fall without syncope or near-syncope. The current level of pain is moderate.  The patient DOES NOT take blood thinner medications.  There is NOT a laceration associated with the injury.  There was no loss of consciousness, confusion, seizure, or memory impairment.  Denies neck pain.  Denies vomiting, numbness/weakness, fever    Workup:  Imaging: CXR and rib series    Given history, exam, and workup, low suspicion for ICH, skull fx, spine fx or other acute spinal syndrome, PTX, pulmonary contusion, cardiac contusion, aortic/vertebral dissection, significant hemorrhage, extremity fracture.    Disposition: Discharge home with strict return precautions and instructions for prompt primary care follow up.        Amount and/or Complexity of Data Reviewed  Labs: ordered. Decision-making details documented in ED Course.  Radiology: ordered. Decision-making details documented in ED Course.    Risk  Prescription drug management.            Scribe Attestation:   Scribe #1: I performed the above scribed service and the documentation accurately describes the services I performed. I attest to the accuracy of the note.        ED Course as of 11/12/24 1751   Mon Nov 11, 2024   0937 X-Ray Chest PA And Lateral [BD]   0937 X-Ray Ribs 2 View Left [BD]   0937 CXR: No acute disease seen, no consolidation, atelectasis or PTX on my individual interpretation.    [BD]   0937 No rib fractures on my interpretation [BD]      ED Course User Index  [BD] Polo Castellanos MD                       I, Polo Castellanos, personally performed  the services described in this documentation. All medical record entries made by the scribe were at my direction and in my presence. I have reviewed the chart and agree that the record reflects my personal performance and is accurate and complete.      DISCLAIMER: This note was prepared with [employer] voice recognition transcription software. Garbled syntax, mangled pronouns, and other bizarre constructions may be attributed to that software system.      Clinical Impression:  Final diagnoses:  [W19.XXXA] Fall  [R07.81] Rib pain on left side          ED Disposition Condition    Discharge Stable          ED Prescriptions    None       Follow-up Information       Follow up With Specialties Details Why Contact Info    Your PCP  Go in 2 days      [facility] - Emergency Dept Emergency Medicine Go to  As needed, If symptoms worsen [address] [facility] [id number] [phone]             Polo Castellanos MD  11/12/24 1191

## 2024-11-11 NOTE — Clinical Note
"Carol Joséleodan Yarbrough was seen and treated in our emergency department on 11/11/2024.  She may return to work on 11/14/2024.       If you have any questions or concerns, please don't hesitate to call.      Polo Castellanos MD"